# Patient Record
Sex: FEMALE | Race: BLACK OR AFRICAN AMERICAN | NOT HISPANIC OR LATINO | Employment: UNEMPLOYED | URBAN - METROPOLITAN AREA
[De-identification: names, ages, dates, MRNs, and addresses within clinical notes are randomized per-mention and may not be internally consistent; named-entity substitution may affect disease eponyms.]

---

## 2017-07-13 ENCOUNTER — TRANSFERRED RECORDS (OUTPATIENT)
Dept: HEALTH INFORMATION MANAGEMENT | Facility: CLINIC | Age: 62
End: 2017-07-13

## 2017-11-15 ENCOUNTER — TRANSFERRED RECORDS (OUTPATIENT)
Dept: HEALTH INFORMATION MANAGEMENT | Facility: CLINIC | Age: 62
End: 2017-11-15

## 2017-12-26 ENCOUNTER — TELEPHONE (OUTPATIENT)
Dept: BEHAVIORAL HEALTH | Facility: CLINIC | Age: 62
End: 2017-12-26

## 2017-12-26 ENCOUNTER — HOSPITAL ENCOUNTER (INPATIENT)
Facility: CLINIC | Age: 62
LOS: 9 days | Discharge: PSYCHIATRIC OR SUBSTANCE USE FACILITY | DRG: 885 | End: 2018-01-04
Attending: PSYCHIATRY & NEUROLOGY | Admitting: PSYCHIATRY & NEUROLOGY
Payer: MEDICARE

## 2017-12-26 DIAGNOSIS — R45.851 SUICIDAL IDEATION: ICD-10-CM

## 2017-12-26 DIAGNOSIS — F10.24 ALCOHOL DEPENDENCE WITH ALCOHOL-INDUCED MOOD DISORDER (H): ICD-10-CM

## 2017-12-26 DIAGNOSIS — F33.1 MODERATE EPISODE OF RECURRENT MAJOR DEPRESSIVE DISORDER (H): ICD-10-CM

## 2017-12-26 LAB
ALCOHOL BREATH TEST: 0.11 (ref 0–0.01)
AMPHETAMINES UR QL SCN: NEGATIVE
BARBITURATES UR QL: NEGATIVE
BENZODIAZ UR QL: NEGATIVE
CANNABINOIDS UR QL SCN: NEGATIVE
COCAINE UR QL: NEGATIVE
ETHANOL UR QL SCN: POSITIVE
OPIATES UR QL SCN: NEGATIVE

## 2017-12-26 PROCEDURE — HZ2ZZZZ DETOXIFICATION SERVICES FOR SUBSTANCE ABUSE TREATMENT: ICD-10-PCS | Performed by: PSYCHIATRY & NEUROLOGY

## 2017-12-26 PROCEDURE — 80320 DRUG SCREEN QUANTALCOHOLS: CPT | Performed by: PSYCHIATRY & NEUROLOGY

## 2017-12-26 PROCEDURE — 12400007 ZZH R&B MH INTERMEDIATE UMMC

## 2017-12-26 PROCEDURE — 82075 ASSAY OF BREATH ETHANOL: CPT | Performed by: PSYCHIATRY & NEUROLOGY

## 2017-12-26 PROCEDURE — A9270 NON-COVERED ITEM OR SERVICE: HCPCS | Mod: GY | Performed by: PSYCHIATRY & NEUROLOGY

## 2017-12-26 PROCEDURE — 90791 PSYCH DIAGNOSTIC EVALUATION: CPT

## 2017-12-26 PROCEDURE — 25000132 ZZH RX MED GY IP 250 OP 250 PS 637: Mod: GY | Performed by: PSYCHIATRY & NEUROLOGY

## 2017-12-26 PROCEDURE — 99285 EMERGENCY DEPT VISIT HI MDM: CPT | Mod: Z6 | Performed by: PSYCHIATRY & NEUROLOGY

## 2017-12-26 PROCEDURE — 80307 DRUG TEST PRSMV CHEM ANLYZR: CPT | Performed by: PSYCHIATRY & NEUROLOGY

## 2017-12-26 PROCEDURE — 99285 EMERGENCY DEPT VISIT HI MDM: CPT | Mod: 25 | Performed by: PSYCHIATRY & NEUROLOGY

## 2017-12-26 RX ORDER — CARVEDILOL 25 MG/1
25 TABLET ORAL 2 TIMES DAILY WITH MEALS
Status: DISCONTINUED | OUTPATIENT
Start: 2017-12-27 | End: 2018-01-04 | Stop reason: HOSPADM

## 2017-12-26 RX ORDER — TRAZODONE HYDROCHLORIDE 150 MG/1
150 TABLET ORAL AT BEDTIME
Status: DISCONTINUED | OUTPATIENT
Start: 2017-12-26 | End: 2017-12-27

## 2017-12-26 RX ORDER — TIOTROPIUM BROMIDE 18 UG/1
18 CAPSULE ORAL; RESPIRATORY (INHALATION) DAILY PRN
COMMUNITY

## 2017-12-26 RX ORDER — PYRIDOXINE HCL (VITAMIN B6) 25 MG
25 TABLET ORAL DAILY
Status: DISCONTINUED | OUTPATIENT
Start: 2017-12-27 | End: 2018-01-04 | Stop reason: HOSPADM

## 2017-12-26 RX ORDER — NICOTINE 21 MG/24HR
1 PATCH, TRANSDERMAL 24 HOURS TRANSDERMAL DAILY
Status: DISCONTINUED | OUTPATIENT
Start: 2017-12-27 | End: 2018-01-04 | Stop reason: HOSPADM

## 2017-12-26 RX ORDER — CYANOCOBALAMIN 1000 UG/ML
1000 INJECTION, SOLUTION INTRAMUSCULAR; SUBCUTANEOUS
Status: DISCONTINUED | OUTPATIENT
Start: 2018-01-20 | End: 2018-01-04 | Stop reason: HOSPADM

## 2017-12-26 RX ORDER — BISACODYL 10 MG
10 SUPPOSITORY, RECTAL RECTAL DAILY PRN
Status: DISCONTINUED | OUTPATIENT
Start: 2017-12-26 | End: 2018-01-04 | Stop reason: HOSPADM

## 2017-12-26 RX ORDER — HYDROXYZINE HYDROCHLORIDE 25 MG/1
25-50 TABLET, FILM COATED ORAL EVERY 4 HOURS PRN
Status: DISCONTINUED | OUTPATIENT
Start: 2017-12-26 | End: 2018-01-04 | Stop reason: HOSPADM

## 2017-12-26 RX ORDER — PAROXETINE 10 MG/1
20 TABLET, FILM COATED ORAL EVERY MORNING
Status: DISCONTINUED | OUTPATIENT
Start: 2017-12-27 | End: 2017-12-27

## 2017-12-26 RX ORDER — DIAZEPAM 5 MG
5-20 TABLET ORAL EVERY 30 MIN PRN
Status: DISCONTINUED | OUTPATIENT
Start: 2017-12-26 | End: 2017-12-28

## 2017-12-26 RX ORDER — GABAPENTIN 600 MG/1
600 TABLET ORAL 3 TIMES DAILY PRN
Status: DISCONTINUED | OUTPATIENT
Start: 2017-12-26 | End: 2017-12-27

## 2017-12-26 RX ORDER — ALBUTEROL SULFATE 90 UG/1
2 AEROSOL, METERED RESPIRATORY (INHALATION) EVERY 6 HOURS PRN
Status: DISCONTINUED | OUTPATIENT
Start: 2017-12-26 | End: 2018-01-04 | Stop reason: HOSPADM

## 2017-12-26 RX ORDER — LORAZEPAM 0.5 MG/1
0.5 TABLET ORAL ONCE
Status: COMPLETED | OUTPATIENT
Start: 2017-12-26 | End: 2017-12-26

## 2017-12-26 RX ORDER — POTASSIUM CHLORIDE 1500 MG/1
20 TABLET, EXTENDED RELEASE ORAL DAILY
Status: DISCONTINUED | OUTPATIENT
Start: 2017-12-27 | End: 2018-01-04 | Stop reason: HOSPADM

## 2017-12-26 RX ORDER — DILTIAZEM HYDROCHLORIDE 360 MG/1
360 CAPSULE, EXTENDED RELEASE ORAL DAILY
Status: DISCONTINUED | OUTPATIENT
Start: 2017-12-27 | End: 2018-01-04 | Stop reason: HOSPADM

## 2017-12-26 RX ORDER — ALUMINA, MAGNESIA, AND SIMETHICONE 2400; 2400; 240 MG/30ML; MG/30ML; MG/30ML
30 SUSPENSION ORAL EVERY 4 HOURS PRN
Status: DISCONTINUED | OUTPATIENT
Start: 2017-12-26 | End: 2018-01-04 | Stop reason: HOSPADM

## 2017-12-26 RX ORDER — CLONIDINE HYDROCHLORIDE 0.1 MG/1
0.1 TABLET ORAL ONCE
Status: COMPLETED | OUTPATIENT
Start: 2017-12-26 | End: 2017-12-26

## 2017-12-26 RX ORDER — FERROUS SULFATE 325(65) MG
325 TABLET ORAL
Status: DISCONTINUED | OUTPATIENT
Start: 2017-12-27 | End: 2018-01-04 | Stop reason: HOSPADM

## 2017-12-26 RX ORDER — PAROXETINE HYDROCHLORIDE HEMIHYDRATE 25 MG/1
25 TABLET, FILM COATED, EXTENDED RELEASE ORAL EVERY MORNING
Status: ON HOLD | COMMUNITY
End: 2018-01-04

## 2017-12-26 RX ORDER — TRAZODONE HYDROCHLORIDE 50 MG/1
50 TABLET, FILM COATED ORAL
Status: DISCONTINUED | OUTPATIENT
Start: 2017-12-26 | End: 2017-12-26 | Stop reason: CLARIF

## 2017-12-26 RX ADMIN — LORAZEPAM 0.5 MG: 0.5 TABLET ORAL at 21:01

## 2017-12-26 RX ADMIN — DIAZEPAM 5 MG: 5 TABLET ORAL at 23:15

## 2017-12-26 RX ADMIN — CLONIDINE HYDROCHLORIDE 0.1 MG: 0.1 TABLET ORAL at 21:01

## 2017-12-26 RX ADMIN — TRAZODONE HYDROCHLORIDE 150 MG: 150 TABLET ORAL at 23:21

## 2017-12-26 ASSESSMENT — ENCOUNTER SYMPTOMS
EYES NEGATIVE: 1
CONSTITUTIONAL NEGATIVE: 1
ENDOCRINE NEGATIVE: 1
DECREASED CONCENTRATION: 1
NEUROLOGICAL NEGATIVE: 1
CARDIOVASCULAR NEGATIVE: 1
GASTROINTESTINAL NEGATIVE: 1
HEMATOLOGIC/LYMPHATIC NEGATIVE: 1
NERVOUS/ANXIOUS: 1
HALLUCINATIONS: 0
SLEEP DISTURBANCE: 1
RESPIRATORY NEGATIVE: 1
MUSCULOSKELETAL NEGATIVE: 1

## 2017-12-26 ASSESSMENT — ACTIVITIES OF DAILY LIVING (ADL)
DRESS: INDEPENDENT
GROOMING: INDEPENDENT
ORAL_HYGIENE: INDEPENDENT

## 2017-12-26 NOTE — IP AVS SNAPSHOT
[facility]    [address]    [place] [address]    Phone:  [phone]                                       After Visit Summary   12/26/2017    Taty Kearney    MRN: [id number]           After Visit Summary Signature Page     I have received my discharge instructions, and my questions have been answered. I have discussed any challenges I see with this plan with the nurse or doctor.    ..........................................................................................................................................  Patient/Patient Representative Signature      ..........................................................................................................................................  Patient Representative Print Name and Relationship to Patient    ..................................................               ................................................  Date                                            Time    ..........................................................................................................................................  Reviewed by Signature/Title    ...................................................              ..............................................  Date                                                            Time

## 2017-12-26 NOTE — TELEPHONE ENCOUNTER
Pt reportedly phoned the  and asked for an inpatient referral. I phoned pt and left message asking for a return call.

## 2017-12-26 NOTE — IP AVS SNAPSHOT
MRN:[id number]                      After Visit Summary   12/26/2017    Taty Kearney    MRN: [id number]           Thank you!     Thank you for choosing [facility] for your care. Our goal is always to provide you with excellent care.        Patient Information     Date Of Birth          1955        Designated Caregiver       Most Recent Value    Caregiver    Will someone help with your care after discharge? no      About your hospital stay     You were admitted on:  December 26, 2017 You last received care in the:  [address]    You were discharged on:  January 4, 2018       Who to Call     For medical emergencies, please call 911.  For non-urgent questions about your medical care, please call your primary care provider or clinic, None          Attending Provider     Provider Specialty    Lenard Malin MD Psychiatry    Jose Cruz, Abimael THOMAS MD Psychiatry    [facility], Haim Pablo MD Psychiatry       Primary Care Provider Fax #    [facility] [phone]      Further instructions from your care team        Behavioral Discharge Planning and Instructions      Summary:  You were admitted on 12/26/2017  due to Depression and Suicidal Ideations.  You were treated by Dr. Haim Montgomery MD and discharged on 01/04/2018 from [address] to Substance Abuse Treatment Program at [facility].    Mental Health Diagnosis  Bipolar Illness, type 2, depressed    Health Care Follow-up Appointments:   Pt is going to a treatment facility and prefers to schedule her own appointments.  Attend all scheduled appointments with your outpatient providers. Call at least 24 hours in advance if you need to reschedule an appointment to ensure continued access to your outpatient providers.   Major Treatments, Procedures and Findings:  You were provided with: a psychiatric assessment, assessed for medical stability and medication evaluation and/or management    Symptoms to Report: feeling more aggressive,  increased confusion, losing more sleep, mood getting worse or thoughts of suicide    Early warning signs can include: increased depression or anxiety sleep disturbances increased thoughts or behaviors of suicide or self-harm  increased unusual thinking, such as paranoia or hearing voices    Safety and Wellness:  Take all medicines as directed.  Make no changes unless your doctor suggests them.      Follow treatment recommendations.  Refrain from alcohol and non-prescribed drugs.  If there is a concern for safety, call 911.    Resources:   Crisis Intervention: 759.399.3331 or 632-342-8395 (TTY: 932.346.4164).  Call anytime for help.  [place] on Mental Illness (www.[place].m[place].org): 544.235.1956 or 089-947-4161.  Alcoholics Anonymous (www.alcoholics-anonymous.org): Check your phone book for your local chapter.  Suicide Awareness Voices of Education (SAVE) (www.save.org): 716-576-HPCZ (6374)  National Suicide Prevention Line (www.mentalhealthmn.org): 972-025-RJBY (7100)  Mental Health Consumer/Survivor Network of [place] (www.mhcsn.net): 631.355.6910 or 700-982-0163  Mental Health Association of [place] (www.mentalhealth.org): 509.850.4624 or 703-890-0657  Self- Management and Recovery Training., SMART-- Toll free: [phone]  [email or web address]  [facility] Crisis (COPE) Response - Adult [phone]    The treatment team has appreciated the opportunity to work with you.     If you have any questions or concerns our unit number is [phone].        Pending Results     No orders found from 12/24/2017 to 12/27/2017.            Statement of Approval     Ordered          01/04/18 1357  I have reviewed and agree with all the recommendations and orders detailed in this document.  EFFECTIVE NOW     Approved and electronically signed by:  Haim Hope MD             Admission Information     Date & Time Provider Department Dept. Phone    12/26/2017 Haim Hope MD [address] [phone]      Your  "Vitals Were     Blood Pressure Pulse Temperature Respirations Height Weight    138/92 92 98.6  F (37  C) (Tympanic) 16 1.651 m (5' 5\") 70.5 kg (155 lb 8 oz)    Pulse Oximetry BMI (Body Mass Index)                100% 25.88 kg/m2          [employer] Information     [employer] lets you send messages to your doctor, view your test results, renew your prescriptions, schedule appointments and more. To sign up, go to [email or web address] . Click on \"Log in\" on the left side of the screen, which will take you to the Welcome page. Then click on \"Sign up Now\" on the right side of the page.     You will be asked to enter the access code listed below, as well as some personal information. Please follow the directions to create your username and password.     Your access code is: [email or web address]  Expires: 2018  1:53 PM     Your access code will  in 90 days. If you need help or a new code, please call your [place] clinic or [phone].        Care EveryWhere ID     This is your Care EveryWhere ID. This could be used by other organizations to access your [place] medical records  [id number]        Equal Access to Services     NICK COURTNEY AH: Dorian martinez [place], waaxda luqadaha, qaybta kaalmada adeegyada, michelle rizzo. So [facility] [phone].    ATENCIÓN: Si habla español, tiene a noyola disposición servicios gratuitos de asistencia lingüística. Llame al [phone].    We comply with applicable federal civil rights laws and [place] laws. We do not discriminate on the basis of race, color, national origin, age, disability, sex, sexual orientation, or gender identity.               Review of your medicines      START taking        Dose / Directions    divalproex 500 MG EC tablet   Commonly known as:  DEPAKOTE        Dose:  500 mg   Take 1 tablet (500 mg) by mouth   Quantity:  60 tablet   Refills:  0       DULoxetine 30 MG EC capsule   Commonly known as:  CYMBALTA        Dose:  30 mg "   Start taking on:  1/5/2018   Take 1 capsule (30 mg) by mouth daily   Quantity:  60 capsule   Refills:  0       hydrALAZINE 25 MG tablet   Commonly known as:  APRESOLINE        Dose:  25-50 mg   Take 1-2 tablets (25-50 mg) by mouth 4 times daily as needed (sbp>160)   Quantity:  120 tablet   Refills:  0       multivitamin, therapeutic with minerals Tabs tablet        Dose:  1 tablet   Start taking on:  1/5/2018   Take 1 tablet by mouth daily   Quantity:  30 each   Refills:  0       nicotine 21 MG/24HR 24 hr patch   Commonly known as:  NICODERM CQ        Dose:  1 patch   Start taking on:  1/5/2018   Place 1 patch onto the skin daily   Quantity:  30 patch   Refills:  0         CONTINUE these medicines which may have CHANGED, or have new prescriptions. If we are uncertain of the size of tablets/capsules you have at home, strength may be listed as something that might have changed.        Dose / Directions    tiotropium 18 MCG capsule   Commonly known as:  SPIRIVA   This may have changed:  Another medication with the same name was removed. Continue taking this medication, and follow the directions you see here.        Dose:  18 mcg   Inhale 18 mcg into the lungs daily as needed   Refills:  0         CONTINUE these medicines which have NOT CHANGED        Dose / Directions    albuterol 108 (90 BASE) MCG/ACT Inhaler   Commonly known as:  PROAIR HFA   Used for:  Tobacco abuse, COPD (chronic obstructive pulmonary disease) (H)        Dose:  2 puff   Inhale 2 puffs into the lungs every 6 hours as needed for shortness of breath / dyspnea.   Quantity:  1 Inhaler   Refills:  5       carvedilol 25 MG tablet   Commonly known as:  COREG        Dose:  25 mg   Take 25 mg by mouth 2 times daily (with meals)   Refills:  0       CHLORTHALIDONE PO        Dose:  12.5 mg   Take 12.5 mg by mouth daily   Refills:  0       cyanocobalamin 1000 MCG/ML injection   Commonly known as:  VITAMIN B12        Dose:  1 mL   Inject 1 mL into the muscle  every 30 days   Refills:  0       diltiazem 360 MG 24 hr capsule   Commonly known as:  TIAZAC   Used for:  Hypertension        Dose:  360 mg   Take 1 capsule by mouth daily.   Quantity:  30 capsule   Refills:  3       ferrous sulfate 325 (65 FE) MG tablet   Commonly known as:  IRON        Dose:  325 mg   Take 325 mg by mouth daily (with breakfast)   Refills:  0       GABAPENTIN PO        Dose:  600 mg   Take 600 mg by mouth 3 times daily as needed (chronic back pain)   Refills:  0       K-DUR PO        Dose:  20 mEq   Take 20 mEq by mouth daily   Refills:  0       TRAZODONE HCL PO        Dose:  150 mg   Take 150 mg by mouth At Bedtime   Refills:  0       VITAMIN B6 PO        Dose:  25 mg   Take 25 mg by mouth daily   Refills:  0       VITAMIN D (CHOLECALCIFEROL) PO        Dose:  1000 Units   Take 1,000 Units by mouth daily   Refills:  0         STOP taking     PARoxetine 25 MG 24 hr tablet   Commonly known as:  PAXIL-CR                    Protect others around you: Learn how to safely use, store and throw away your medicines at www.disposemymeds.org.             Medication List: This is a list of all your medications and when to take them. Check marks below indicate your daily home schedule. Keep this list as a reference.      Medications           Morning Afternoon Evening Bedtime As Needed    albuterol 108 (90 BASE) MCG/ACT Inhaler   Commonly known as:  PROAIR HFA   Inhale 2 puffs into the lungs every 6 hours as needed for shortness of breath / dyspnea.                                carvedilol 25 MG tablet   Commonly known as:  COREG   Take 25 mg by mouth 2 times daily (with meals)   Last time this was given:  25 mg on 1/4/2018  8:18 AM                                CHLORTHALIDONE PO   Take 12.5 mg by mouth daily   Last time this was given:  12.5 mg on 1/4/2018  8:18 AM                                cyanocobalamin 1000 MCG/ML injection   Commonly known as:  VITAMIN B12   Inject 1 mL into the muscle every 30  days                                diltiazem 360 MG 24 hr capsule   Commonly known as:  TIAZAC   Take 1 capsule by mouth daily.   Last time this was given:  360 mg on 1/4/2018  8:18 AM                                divalproex 500 MG EC tablet   Commonly known as:  DEPAKOTE   Take 1 tablet (500 mg) by mouth   Last time this was given:  500 mg on 1/3/2018  8:43 PM                                DULoxetine 30 MG EC capsule   Commonly known as:  CYMBALTA   Take 1 capsule (30 mg) by mouth daily   Start taking on:  1/5/2018   Last time this was given:  30 mg on 1/4/2018  8:18 AM                                ferrous sulfate 325 (65 FE) MG tablet   Commonly known as:  IRON   Take 325 mg by mouth daily (with breakfast)   Last time this was given:  325 mg on 1/4/2018  8:18 AM                                GABAPENTIN PO   Take 600 mg by mouth 3 times daily as needed (chronic back pain)   Last time this was given:  600 mg on 1/4/2018  1:36 PM                                hydrALAZINE 25 MG tablet   Commonly known as:  APRESOLINE   Take 1-2 tablets (25-50 mg) by mouth 4 times daily as needed (sbp>160)   Last time this was given:  25 mg on 12/27/2017  2:07 AM                                K-DUR PO   Take 20 mEq by mouth daily   Last time this was given:  20 mEq on 1/4/2018  8:20 AM                                multivitamin, therapeutic with minerals Tabs tablet   Take 1 tablet by mouth daily   Start taking on:  1/5/2018   Last time this was given:  1 tablet on 1/4/2018  8:18 AM                                nicotine 21 MG/24HR 24 hr patch   Commonly known as:  NICODERM CQ   Place 1 patch onto the skin daily   Start taking on:  1/5/2018   Last time this was given:  1 patch on 1/4/2018  8:18 AM                                tiotropium 18 MCG capsule   Commonly known as:  SPIRIVA   Inhale 18 mcg into the lungs daily as needed                                TRAZODONE HCL PO   Take 150 mg by mouth At Bedtime   Last time  this was given:  150 mg on 1/3/2018  9:30 PM                                VITAMIN B6 PO   Take 25 mg by mouth daily                                VITAMIN D (CHOLECALCIFEROL) PO   Take 1,000 Units by mouth daily   Last time this was given:  1,000 Units on 1/4/2018  8:18 AM

## 2017-12-26 NOTE — TELEPHONE ENCOUNTER
Pt phoned, she states she is looking for inpatient CD treatment. I gave her the number to schedule a CD eval.

## 2017-12-27 LAB
ALBUMIN SERPL-MCNC: 3.7 G/DL (ref 3.4–5)
ALP SERPL-CCNC: 98 U/L (ref 40–150)
ALT SERPL W P-5'-P-CCNC: 80 U/L (ref 0–50)
ANION GAP SERPL CALCULATED.3IONS-SCNC: 8 MMOL/L (ref 3–14)
AST SERPL W P-5'-P-CCNC: 97 U/L (ref 0–45)
BASOPHILS # BLD AUTO: 0 10E9/L (ref 0–0.2)
BASOPHILS NFR BLD AUTO: 0.5 %
BILIRUB SERPL-MCNC: 1.2 MG/DL (ref 0.2–1.3)
BUN SERPL-MCNC: 11 MG/DL (ref 7–30)
CALCIUM SERPL-MCNC: 9.2 MG/DL (ref 8.5–10.1)
CHLORIDE SERPL-SCNC: 102 MMOL/L (ref 94–109)
CO2 SERPL-SCNC: 31 MMOL/L (ref 20–32)
CREAT SERPL-MCNC: 0.96 MG/DL (ref 0.52–1.04)
DEPRECATED CALCIDIOL+CALCIFEROL SERPL-MC: 25 UG/L (ref 20–75)
DIFFERENTIAL METHOD BLD: ABNORMAL
EOSINOPHIL # BLD AUTO: 0.2 10E9/L (ref 0–0.7)
EOSINOPHIL NFR BLD AUTO: 3.5 %
ERYTHROCYTE [DISTWIDTH] IN BLOOD BY AUTOMATED COUNT: 13 % (ref 10–15)
FOLATE SERPL-MCNC: 45 NG/ML
GFR SERPL CREATININE-BSD FRML MDRD: 59 ML/MIN/1.7M2
GGT SERPL-CCNC: 131 U/L (ref 0–40)
GLUCOSE SERPL-MCNC: 111 MG/DL (ref 70–99)
HCT VFR BLD AUTO: 45.8 % (ref 35–47)
HGB BLD-MCNC: 15.9 G/DL (ref 11.7–15.7)
IMM GRANULOCYTES # BLD: 0 10E9/L (ref 0–0.4)
IMM GRANULOCYTES NFR BLD: 0.2 %
LYMPHOCYTES # BLD AUTO: 3.2 10E9/L (ref 0.8–5.3)
LYMPHOCYTES NFR BLD AUTO: 50.2 %
MCH RBC QN AUTO: 32.3 PG (ref 26.5–33)
MCHC RBC AUTO-ENTMCNC: 34.7 G/DL (ref 31.5–36.5)
MCV RBC AUTO: 93 FL (ref 78–100)
MONOCYTES # BLD AUTO: 0.4 10E9/L (ref 0–1.3)
MONOCYTES NFR BLD AUTO: 6.5 %
NEUTROPHILS # BLD AUTO: 2.5 10E9/L (ref 1.6–8.3)
NEUTROPHILS NFR BLD AUTO: 39.1 %
NRBC # BLD AUTO: 0 10*3/UL
NRBC BLD AUTO-RTO: 0 /100
PLATELET # BLD AUTO: 213 10E9/L (ref 150–450)
POTASSIUM SERPL-SCNC: 3.3 MMOL/L (ref 3.4–5.3)
PROT SERPL-MCNC: 7.7 G/DL (ref 6.8–8.8)
RBC # BLD AUTO: 4.92 10E12/L (ref 3.8–5.2)
SODIUM SERPL-SCNC: 141 MMOL/L (ref 133–144)
TSH SERPL DL<=0.005 MIU/L-ACNC: 0.59 MU/L (ref 0.4–4)
VIT B12 SERPL-MCNC: 534 PG/ML (ref 193–986)
WBC # BLD AUTO: 6.3 10E9/L (ref 4–11)

## 2017-12-27 PROCEDURE — 25000132 ZZH RX MED GY IP 250 OP 250 PS 637: Mod: GY | Performed by: PSYCHIATRY & NEUROLOGY

## 2017-12-27 PROCEDURE — 12400007 ZZH R&B MH INTERMEDIATE UMMC

## 2017-12-27 PROCEDURE — 82306 VITAMIN D 25 HYDROXY: CPT | Performed by: PSYCHIATRY & NEUROLOGY

## 2017-12-27 PROCEDURE — 99222 1ST HOSP IP/OBS MODERATE 55: CPT | Performed by: NURSE PRACTITIONER

## 2017-12-27 PROCEDURE — 25000132 ZZH RX MED GY IP 250 OP 250 PS 637: Mod: GY | Performed by: INTERNAL MEDICINE

## 2017-12-27 PROCEDURE — 90853 GROUP PSYCHOTHERAPY: CPT

## 2017-12-27 PROCEDURE — 99207 ZZC CONSULT E&M CHANGED TO INITIAL LEVEL: CPT | Performed by: NURSE PRACTITIONER

## 2017-12-27 PROCEDURE — A9270 NON-COVERED ITEM OR SERVICE: HCPCS | Mod: GY | Performed by: PSYCHIATRY & NEUROLOGY

## 2017-12-27 PROCEDURE — 80053 COMPREHEN METABOLIC PANEL: CPT | Performed by: PSYCHIATRY & NEUROLOGY

## 2017-12-27 PROCEDURE — 82977 ASSAY OF GGT: CPT | Performed by: PSYCHIATRY & NEUROLOGY

## 2017-12-27 PROCEDURE — A9270 NON-COVERED ITEM OR SERVICE: HCPCS | Mod: GY | Performed by: NURSE PRACTITIONER

## 2017-12-27 PROCEDURE — A9270 NON-COVERED ITEM OR SERVICE: HCPCS | Mod: GY | Performed by: INTERNAL MEDICINE

## 2017-12-27 PROCEDURE — 84443 ASSAY THYROID STIM HORMONE: CPT | Performed by: PSYCHIATRY & NEUROLOGY

## 2017-12-27 PROCEDURE — 82607 VITAMIN B-12: CPT | Performed by: PSYCHIATRY & NEUROLOGY

## 2017-12-27 PROCEDURE — 82746 ASSAY OF FOLIC ACID SERUM: CPT | Performed by: PSYCHIATRY & NEUROLOGY

## 2017-12-27 PROCEDURE — 85025 COMPLETE CBC W/AUTO DIFF WBC: CPT | Performed by: PSYCHIATRY & NEUROLOGY

## 2017-12-27 PROCEDURE — 25000132 ZZH RX MED GY IP 250 OP 250 PS 637: Mod: GY | Performed by: NURSE PRACTITIONER

## 2017-12-27 PROCEDURE — 36415 COLL VENOUS BLD VENIPUNCTURE: CPT | Performed by: PSYCHIATRY & NEUROLOGY

## 2017-12-27 RX ORDER — DULOXETIN HYDROCHLORIDE 30 MG/1
30 CAPSULE, DELAYED RELEASE ORAL DAILY
Status: DISCONTINUED | OUTPATIENT
Start: 2017-12-27 | End: 2018-01-04 | Stop reason: HOSPADM

## 2017-12-27 RX ORDER — PRAZOSIN HYDROCHLORIDE 1 MG/1
1 CAPSULE ORAL AT BEDTIME
Status: DISCONTINUED | OUTPATIENT
Start: 2017-12-27 | End: 2017-12-28

## 2017-12-27 RX ORDER — GABAPENTIN 600 MG/1
600 TABLET ORAL 3 TIMES DAILY
Status: DISCONTINUED | OUTPATIENT
Start: 2017-12-27 | End: 2018-01-04 | Stop reason: HOSPADM

## 2017-12-27 RX ORDER — DIVALPROEX SODIUM 500 MG/1
500 TABLET, DELAYED RELEASE ORAL
Status: DISCONTINUED | OUTPATIENT
Start: 2017-12-27 | End: 2018-01-04 | Stop reason: HOSPADM

## 2017-12-27 RX ADMIN — VITAMIN D, TAB 1000IU (100/BT) 1000 UNITS: 25 TAB at 08:45

## 2017-12-27 RX ADMIN — PAROXETINE 20 MG: 10 TABLET, FILM COATED ORAL at 08:47

## 2017-12-27 RX ADMIN — DIAZEPAM 5 MG: 5 TABLET ORAL at 09:41

## 2017-12-27 RX ADMIN — Medication 25 MG: at 08:47

## 2017-12-27 RX ADMIN — DILTIAZEM HYDROCHLORIDE 360 MG: 360 CAPSULE, EXTENDED RELEASE ORAL at 08:46

## 2017-12-27 RX ADMIN — CARVEDILOL 25 MG: 25 TABLET, FILM COATED ORAL at 08:45

## 2017-12-27 RX ADMIN — Medication 25 MG: at 02:07

## 2017-12-27 RX ADMIN — CARVEDILOL 25 MG: 25 TABLET, FILM COATED ORAL at 18:00

## 2017-12-27 RX ADMIN — FERROUS SULFATE TAB 325 MG (65 MG ELEMENTAL FE) 325 MG: 325 (65 FE) TAB at 08:45

## 2017-12-27 RX ADMIN — GABAPENTIN 600 MG: 600 TABLET, FILM COATED ORAL at 14:11

## 2017-12-27 RX ADMIN — NICOTINE 1 PATCH: 21 PATCH, EXTENDED RELEASE TRANSDERMAL at 08:46

## 2017-12-27 RX ADMIN — Medication 12.5 MG: at 08:46

## 2017-12-27 RX ADMIN — DIVALPROEX SODIUM 500 MG: 500 TABLET, DELAYED RELEASE ORAL at 19:20

## 2017-12-27 RX ADMIN — GABAPENTIN 600 MG: 600 TABLET, FILM COATED ORAL at 19:20

## 2017-12-27 RX ADMIN — HYDROXYZINE HYDROCHLORIDE 50 MG: 25 TABLET ORAL at 21:12

## 2017-12-27 RX ADMIN — DIAZEPAM 5 MG: 5 TABLET ORAL at 05:44

## 2017-12-27 RX ADMIN — HYDROXYZINE HYDROCHLORIDE 50 MG: 25 TABLET ORAL at 16:53

## 2017-12-27 RX ADMIN — POTASSIUM CHLORIDE 20 MEQ: 20 TABLET, EXTENDED RELEASE ORAL at 08:47

## 2017-12-27 RX ADMIN — DULOXETINE HYDROCHLORIDE 30 MG: 30 CAPSULE, DELAYED RELEASE ORAL at 09:26

## 2017-12-27 RX ADMIN — PRAZOSIN HYDROCHLORIDE 1 MG: 1 CAPSULE ORAL at 21:11

## 2017-12-27 ASSESSMENT — ACTIVITIES OF DAILY LIVING (ADL)
GROOMING: INDEPENDENT
ORAL_HYGIENE: INDEPENDENT
DRESS: INDEPENDENT
GROOMING: INDEPENDENT
ORAL_HYGIENE: INDEPENDENT
DRESS: INDEPENDENT

## 2017-12-27 NOTE — PROGRESS NOTES
"Pt admitted to [facility] [address] [place] [address] from [facility] where she was BIB her daughter in law today.  Pt was to start attending an out pt chemical dependency program today, however pt unable to attend due to increased depression and continued use of ETOH.  Pt has been drinking 6 - 8 drinks daily for the past several months.  Pt reports she has been feeling suicidal.  No hx of SI attempts.  Pt's brother committed suicide 3 yrs ago by OD.  Pt reports that her suicidal thoughts are fleeting and \"for attention.\"  Pt states she has been taking 200 mg Trazodone, although 150 mg is prescribed - states she may be trying to OD.  This is her fist  hospitalization.  Pt reports she would like to attend in-patient treatment, as she feels this may be more effective.  Alcohol breath test 0.11 in ED. BP elevated in ED - given Ativan 0.5 mg and Catapress 0.1 mg at 2109.     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  Denies seizure hx.    On the unit, pt is calm and cooperative. Upon arrival to , /105, , T 99.7.  MSSA Score:  8, 5 mg valium given at 2315.  Pt endorses depression and anxiety.  Denies SI at this time and contracts for safety.    "

## 2017-12-27 NOTE — PROGRESS NOTES
"   12/27/17 [facility]   Behavioral Health   1. Wish to be Dead No   Pt denies SI.  \"No- I really want to get help, not be passed over.\"  Seeks ETOH treatment.  Reports her family/children are a good support but has insight regarding that those relationships to keep separate from professional help.  \"don't want to weigh them down\"    "

## 2017-12-27 NOTE — PROGRESS NOTES
"CLINICAL NUTRITION SERVICES - ASSESSMENT NOTE     Nutrition Prescription    RECOMMENDATIONS FOR MDs/PROVIDERS TO ORDER:  None today    Malnutrition Status:    Non-severe malnutrition in the context of environmental or social circumstances    Recommendations already ordered by Registered Dietitian (RD):  Ensure Plus QID with meals and HS snack    Future/Additional Recommendations:  As PO intakes improve, consider decreasing supplementation      REASON FOR ASSESSMENT  Taty Kearney is a/an 62 year old female assessed by the dietitian for Admission Nutrition Risk Screen for unintentional loss of 10# or more in the past two months and reduced oral intake over the last month    NUTRITION HISTORY  - Pt reports that she has had very little PO intake over the last 3-4 days since her alcohol use has been increased. She eats a wide variety of foods and denies any food allergies or intolerances. She like to drink Ensure Plus/Boost at home but finds they are too expensive to drink regularly.   - Pt reports she had a gastrectomy and her whole stomach and part of her small intestine is removed. She c/o diarrhea when she overeats or drinks alcohol.   - Currently, she has no appetite and has not been eating well during this admission.     CURRENT NUTRITION ORDERS  Diet: Regular  Intake/Tolerance: pt reports eating mashed potatoes w/ gravy and some of her dinner roll for lunch today.     LABS  Labs reviewed  - K+ 3.3 (L)  - Folate 45 (wnl)  - Vitamin B12 534 (wnl)  - Vitamin D deficiency screen 25 (wnl)    MEDICATIONS  Medications reviewed  - Vitamin B6  - Vitamin B12  - Vitamin D3  - Iron    ANTHROPOMETRICS  Height: 165.1 cm (5' 5\")  Most Recent Weight: 65.1 kg (143 lb 8 oz)    IBW: 56.8 kg (114% IBW)  BMI: Normal BMI  Weight History: minimal wt hx. Pt reports her UBW is 157 lbs and she reports losing 10 lbs over the last several weeks and feels her clothing is fitting looser. Per UBW, she has lost 13.5 lbs (8.6%). She would like " to return to 157 lbs.  Wt Readings from Last 5 Encounters:   12/26/17 65.1 kg (143 lb 8 oz)   10/15/15 76.8 kg (169 lb 6.4 oz)   01/24/15 81.5 kg (179 lb 9.6 oz)   12/14/14 80.7 kg (178 lb)   06/13/12 92.5 kg (204 lb)     Dosing Weight: 65 kg - admit wt    ASSESSED NUTRITION NEEDS  Estimated Energy Needs: 1293-5067 kcals/day (25 - 30 kcals/kg)  Justification: Maintenance  Estimated Protein Needs: 65-78 grams protein/day (1 - 1.2 grams of pro/kg)  Justification: Maintenance  Estimated Fluid Needs: 1 mL/kcal  Justification: Maintenance    PHYSICAL FINDINGS  See malnutrition section below.    MALNUTRITION  % Intake: < 75% for > 7 days (non-severe)  % Weight Loss: > 5% in 1 month (severe)  Subcutaneous Fat Loss: Facial region: mild  Muscle Loss: None observed  Fluid Accumulation/Edema: None noted  Malnutrition Diagnosis: Non-severe malnutrition in the context of environmental or social circumstances    NUTRITION DIAGNOSIS  Inadequate oral intake related to poor appetite as evidenced by pt likely meeting <50% of nutritional needs over the last 3 days and a 8.6% wt loss over the last ~month.    INTERVENTIONS  Implementation  Discussed nutrition history and PO since admission. Discussed menu ordering and snacks available on the unit. Pt states she enjoys the food but just does not have an appetite. Discussed oral nutrition supplements and she enjoys Ensure Plus (all flavors except chocolate). Encouraged adequate PO of food and fluids.    Goals  Patient to consume % of nutritionally adequate meal trays TID, or the equivalent with supplements/snacks.     Monitoring/Evaluation  Progress toward goals will be monitored and evaluated per protocol.      Matilde Ashby RD, LD  Unit Pager: [phone]

## 2017-12-27 NOTE — CONSULTS
[facility]  Internal Medicine Initial Visit      Taty Kearney MRN# [id number]   YOB: 1955 Age: 62 year old   Date of Admission: 12/26/2017  PCP: [facility]  Date of Service: 12/27/2017    Referring Provider: Behavioral Health - Abimael Billingsley MD  Reason for Visit: H/o HTN with elevated BP in the context of ETOH detox.          Assessment and Recommendations:     Taty Kearney is a 62 year old female with a history of hepatitis C s/p treatment, recurrrent gential HSV, pancreatitis, HTN, ETOH abuse, depression, COPD, bilateral OA of the knees s/p TKA, tobacco use, bipolar 2 d/o, PTSD, h/o GIB who was admitted and being followed by psychiatry for depression and etoh detox. Please refer to psychiatry notes for further details. IM was asked to see this patient for HTN management in the context of withdrawal.     1. Mental health decompensation  Management per primary psychiatry team    2. ETOH abuse with detox. ETOH level on admission 0.11. No h/o seizures. + hallucinations.   - Recommend MVI, folic acid and thiamine supplementation  - Agree with MSSA with valium  - Management per primary psychiatry team    3. HTN, uncontrolled. Elevation likely r/t acute withdrawal. Absent coronary symptoms. No h/o CVA. No HA. PTA on diltiazem XR 360mg daily, chlorthalidone 12.5mg daily, coreg 25mg BID. + smoker. No DM. Last  (2013). No family history of heart disease.   - Continue PTA meds.   - Hydralazine prn ordered.   - IM will follow BP trend with you.     4. COPD. PTA on albuterol prn. Last use 1 week ago. No controller therapies. Sx stable. On RA.   - Continue albuterol prn.     5. Recurrent genital HSV. No active flare. Not on suppressive therapy.     6. H/o hep C, acute transaminitis. S/p Harvoni 2014. Last RNA quant negative 2015. LFT function previously WNL. This admission, ALT 80, AST 97,  most reflective of ETOH induced elevation. Clinically asymptomatic.    - CMP in 24 hours for stability.     7. H/o pancreatitis. Verbalized to be 2/2 ETOH, advil. Last flare 1 year ago. No abdominal or back pain today.     8. Neuropathy. Likely 2/2 ETOH. No h/o DM. PTA on neurontin 600mg TID.   - Continue.     9. H/o vitamin B12 and iron deficiency. PTA on vitamin B12 q 30 days, iron daily.   - Continue home therapies. Last Vitamin B12 dose 12/22/17.     10. Hypokalemia. PTA on potassium 20meq daily. Likely r/t ETOH abuse and diuretic use. 3.3 today.   - Continue home potassium. Received dose this am. K tomorrow.     11. Acute polycythemia. Likely r/t smoking and/or COPD history or recent ETOH binge.   - Push po fluids. No IP w/u indicated.     Medicine will follow BP with you. No further medicine recommendations at this time, please page with any additional concerns.   Thank you for this opportunity to be involved in your patient's care.      Monie Good, CHLOE, CNP  Hospitalist Service   For questions, contact the job code.            History of Present Illness:   History is obtained from the patient and medical record.     Taty Kearney is a 62 year old female with a history of hepatitis C s/p treatment, recurrrent gential HSV, pancreatitis, HTN, ETOH abuse, depression, COPD, bilateral OA of the knees s/p TKA, tobacco use, bipolar 2 d/o, PTSD, h/o GIB who was admitted and being followed by psychiatry for depression and etoh detox. Please refer to psychiatry notes for further details. IM was asked to see this patient for HTN management in the context of withdrawal.     [place] [facility] States she takes 3 medications for her BP (coreg, chlorthalidone, diltiazem). Denies h/o CAD, heart failure, MI. States no family history of heart disease that she is aware of. Mentions continuation of smoking, 1ppd. Does have diagnosis of COPD. States last albuterol use was 1 week ago and infrequent. No controller medications. Per patient, her SBP is usually 120, elevations here  "are \"abnormal\". Mentions ETOH daily use in the past 4 months. Does have h/o severe withdrawal with hallucinations. No seizures. Denies feeling anxious. No uncontrolled pain. Chronic neuropathy controlled on neurontin TID. She is wondering why she has not got that yet today. Denies any active CP. Very minimal HA this morning but \"it will go away\" and \"not uncommon for me\".           Review of Systems:     Constitutional: negative for fever/chills or recent weight changes   Eyes: negative for vision changes   Ears/Nose/Throat: negative for ear pain, sore throat, nasal or sinus congestion   Cardiovascular: negative for chest pain or palpitations   Respiratory: negative for cough or shortness of breath   Gastrointestinal: negative for abdominal pain, N/V, diarrhea or constipation   Genitourinary: negative for dysuria, urinary urgency or frequency   Musculoskeletal: negative for new joint pain   Neurologic: HA as above. N numbness, tingling, weakness of extremities. Neuropathy at BL.   Skin: negative for rashes or wounds   Endocrine: negative for polydipsia, polyphagia, polyuria; negative for heat/cold intolerance           Past Medical History:   PMH has been reviewed and updated with visit today.     Past Medical History:   Diagnosis Date     Bipolar 2 disorder (H)      COPD (chronic obstructive pulmonary disease) (H)      Depression      Ectopic pregnancy      ETOH abuse 2005 and 1999    treatment for both ETOH and crack     History of tubal ligation      HTN      Menopause      Neuroendocrine tumor 2013    s/p gastrectomy     Neuropathy      Osteoarthritis of both knees      Pancreatitis, acute     recurrent 7/2008     PTSD (post-traumatic stress disorder)      Recurrent HSV (herpes simplex virus)     genital     Tobacco abuse      Unspecified drug dependence, unspecified      Unspecified viral hepatitis C without hepatic coma     treated [facility] toy 2014     Vitamin B12 deficiency              Past Surgical " History:   PSH has been reviewed and updated with visit today.     Past Surgical History:   Procedure Laterality Date     BREAST BIOPSY, RT/LT      right only benign     C TOTAL KNEE ARTHROPLASTY Bilateral      C/SECTION, CLASSICAL  three         GASTRECTOMY  2013    r/t neuroendocrine tumor     TUBAL LIGATION               Social History:   Patient's social history has been reviewed and updated today.     Social History     Social History     Marital status:      Spouse name: N/A     Number of children: N/A     Years of education: N/A     Occupational History     Not on file.     Social History Main Topics     Smoking status: Current Every Day Smoker     Packs/day: 1.00     Types: Cigarettes     Smokeless tobacco: Never Used     Alcohol use No      Comment: quit x 2.5 months ago gone back to AA     Drug use: No     Sexual activity: Yes     Partners: Male     Other Topics Concern     Not on file     Social History Narrative    Intake 17:            Has 4 children whom are all healthy.         Currently on disability.     H/o 12th grade education        Tobacco use: 1ppd    Alcohol use: 4 months of daily alcohol consumption that includes 6-12 beers daily with 2 shots of yamile.     Drug use: denies              Family History:   Patient's family history has been reviewed and updated today.     Family History   Problem Relation Age of Onset     DIABETES Maternal Grandmother      Blood Disease Brother      aids     Blood Disease Son      hiv     DIABETES Maternal Aunt      Family Status   Relation Status     Maternal Grandmother      Brother      Son      Mother     murdered     Father     murdered     Maternal Aunt              Allergies:   Patient's allergies have been reviewed and updated today.     No Known Allergies          Medications:     Diltiazemn ER 360mg daily  Albuterol prn  Vitamin D 100mcg daily  Potassium 20meq daily  Neurontin 600mg  "TID  Trazodone 150mg at hs.   Chlorthalidone 12.5mg daily  Paxil CR 25mg daily  Coreg 25mg BID  Iron supplementation 325mg daily  Vitamin B12 every 30 days. Last injection 12/22           Physical Exam:     BP (!) 158/97  Pulse 76  Temp 97.5  F (36.4  C) (Tympanic)  Resp 16  Ht 1.651 m (5' 5\")  Wt 65.1 kg (143 lb 8 oz)  SpO2 94%  BMI 23.88 kg/m2    Vitals are reviewed.     GENERAL: Non-toxic appearing in NAMD.   HEENT: Anicteric sclera. PERRLa intact. Mucous membranes moist. No thrush. Hair wrap in place.   CV: RRR. S1, S2. No murmurs appreciated.   RESPIRATORY: Effort normal at rest and with talking activity. Lungs with restricted movement throughout but clear.   GI: Abdomen soft and non distended with normoactive bowel sounds present in all quadrants. No tenderness. No mass.   NEUROLOGICAL: No involuntary movements. A&Ox 3. Facial symmetry intact. No confusion. Mood down.   MUSC:  Joint appearance without acute swelling, warmth, redness. Ambulates with ease.   EXTREMITIES: No peripheral edema. Good distal perfusion.   SKIN: No jaundice. No rashes or sores to exposed body areas.           Data:   CBC:  Recent Labs   Lab Test  12/27/17   0800   WBC  6.3   RBC  4.92   HGB  15.9*   HCT  45.8   MCV  93   MCH  32.3   MCHC  34.7   RDW  13.0   PLT  213       CMP:  Recent Labs   Lab Test  12/27/17   0800   NA  141   POTASSIUM  3.3*   CHLORIDE  102   SHIRAZ  9.2   CO2  31   BUN  11   CR  0.96   GLC  111*   AST  97*   ALT  80*   BILITOTAL  1.2   ALBUMIN  3.7   PROTTOTAL  7.7   ALKPHOS  98       TSH:  TSH   Date Value Ref Range Status   12/27/2017 0.59 0.40 - 4.00 mU/L Final     ETOH 0.011  HCG: postmenopausal    Unresulted Labs Ordered in the Past 30 Days of this Admission     Date and Time Order Name Status Description    12/27/2017 0030 Vitamin D In process                "

## 2017-12-27 NOTE — PLAN OF CARE
"Problem: Suicidal Behavior (Adult)  Goal: Suicidal Behavior is Absent/Minimized/Managed    Attended 1 of 2 OT groups. She participated in activity focused on Aftercare. She offered answers in context. Stated wanting to expand her coping strategies though provided a list of 5 ideas to \"help me not drink\". Affect was flat, eye contact was direct to speaker. She was pleasant on approach, seemed interested in being actively involved and attentive. Pt will be given to complete a written self assessment. OT purpose was explained with the value of having involvement in treatment plan, and provided options to meet self identified goals.  Plan: Will Provide structure, support, and encouragement. Offer education on coping strategies and life management skills. Assist pt to increase self awareness regarding mental health issues and expand network of stress management resources. Assess further.            "

## 2017-12-27 NOTE — PROGRESS NOTES
"Pt reports vaginal odor- \"like when I had trichomonas\" \"recent new sex partner\"  Denies discharge at this time.  Requested that IM be informed.  Paged PA on call to report above.    "

## 2017-12-27 NOTE — PLAN OF CARE
Problem: General Plan of Care (Inpatient Behavioral)  Goal: Individualization/Patient Specific Goal (IP Behavioral)  The patient and/or their representative will achieve their patient-specific goals related to the plan of care.    The patient-specific goals include:      Reasons you are in the hospital:  1)  I was drinking to much  2)  Suicidal ideation    Goals for Discharge:  1)  Would like inpatient CD treatment

## 2017-12-27 NOTE — PROGRESS NOTES
MSSA @ 0200-4. No Valium given.  BP-181-105. House Officer notified. Apresoline 25-50 mg. precribed PRN for BP >160. Apresoline 25 mg. given @ 0207.     MSSA @ 0540-9. Valium 5 mg. given per MSSA protocol. BP-158/97, P-76, R-16 and T- 98.0. Minimal tremors and diaphoresis noted. No hallucinations and agitation noted. Slept 6 hours.

## 2017-12-27 NOTE — PROGRESS NOTES
A                  12/26/17 2246   Patient Belongings   Did you bring any home meds/supplements to the hospital?  Yes   Disposition of meds  Sent to security/pharmacy per site process   Patient Belongings clothing;cell phone/electronics;keys;ring;shoes;purse;wallet;glasses;other (see comments)   Disposition of Belongings Kept with patient;Locker;Sent to security per site process   Belongings Search Yes   General Info Comment Envelope in security   Admission:12/26/2017  Items in pt's room: A pair of shoes, personal clothing, and a pair of glasses.    Items in pt's locker: A purse x 2, a wallet, a cell phone, a , a winter jacket, a ring and   a winter cap    Items sent to security: ( Env #[id number]), [place] 's, [facility] bank check book, # [phone]:    I am responsible for any personal items that are not sent to the safe or pharmacy.  [facility] is not responsible for loss, theft or damage of any property in my possession.    Signature:  _________________________________ Date: _______  Time: _____                                              Staff Signature:  ____________________________ Date: ________  Time: _____      2nd Staff person, if patient is unable/unwilling to sign:    Signature: ________________________________ Date: ________  Time: _____     Discharge:  [facility] has returned all of my personal belongings:    Signature: _________________________________ Date: ________  Time: _____                                          Staff Signature:  ____________________________ Date: ________  Time: _____

## 2017-12-27 NOTE — PROGRESS NOTES
"Patient participated actively in 60 minute \"coping skills Prudence group\" wherein she and other participants used the fame of Prudence to identify personal coping skills at their disposal that they can use in situations which are challenging.    "

## 2017-12-27 NOTE — ED PROVIDER NOTES
History     Chief Complaint   Patient presents with     Depression     last several days has had increased depression, suicide thoughts. has been drinking 6-8 drinks daily and taking 200 trazadone to sleep (should be on 150)     Suicidal     Patient is a 62 year old female presenting with Depression:. The history is provided by the patient and medical records.   Depression       Taty Kearney is a 62 year old female who is here brought in by daughter-in-law. Patient has history of depression and chemical dependence. She gets psychiatric care through [facility] [place]. She last saw her provider last month. There was discussion about getting patient into CD treatment. She was preferring to try an outpatient program. She saw her primary care provider last week. She was reporting intent on pursuing an outpatient CD assessment that was coming up. Patient in the meantime is continuing to drink, usually going through 6-8 drinks daily. She decided that she would like inpatient CD treatment. They called [facility] today and this was in process. Patient was brought here believing that she will get into inpatient CD treatment faster. She reports that she is feeling suicidal. She has never been hospitalized psychiatrically. She has been taking higher than her prescribed trazodone dose to help with sleeping. Patient admits that she may be trying to overdose, as it is her suicidal plan. Patient breathalized 0.11 on arrival. She last drank this afternoon. She denies history of withdrawal seizures.     Patient denies acute medical concerns. She has history of gastrectomy in 2013 to remove a neuroendocrine tumor. She has chronic kidney disease. She has history of polysubstance abuse.    Please see [facility] Crisis Assessment on 12/26/17 in [facility] for further details.    PERSONAL MEDICAL HISTORY  Past Medical History:   Diagnosis Date     Depression      Ectopic pregnancy      ETOH abuse 2005 and 1999    treatment for both ETOH and crack      History of tubal ligation      HTN      Hypertension      Menopause      Pancreatitis, acute     recurrent 2008     Recurrent HSV (herpes simplex virus)      Tobacco abuse      Unspecified drug dependence, unspecified      Unspecified viral hepatitis C without hepatic coma      PAST SURGICAL HISTORY  Past Surgical History:   Procedure Laterality Date     BREAST BIOPSY, RT/LT      right only benign     C/SECTION, CLASSICAL  three         TUBAL LIGATION       FAMILY HISTORY  Family History   Problem Relation Age of Onset     DIABETES Maternal Grandmother      DIABETES Maternal Aunt      Blood Disease Brother      aids     Blood Disease Son      hiv     SOCIAL HISTORY  Social History   Substance Use Topics     Smoking status: Current Every Day Smoker     Packs/day: 1.00     Types: Cigarettes     Smokeless tobacco: Never Used     Alcohol use No      Comment: quit x 2.5 months ago gone back to AA     MEDICATIONS  No current facility-administered medications for this encounter.      Current Outpatient Prescriptions   Medication     Pyridoxine HCl (VITAMIN B6 PO)     Tiotropium Bromide Monohydrate (SPIRIVA HANDIHALER IN)     oxyCODONE-acetaminophen (PERCOCET) 5-325 MG per tablet     sennosides (SENOKOT) 8.6 MG tablet     morphine (MSIR) 15 MG tablet     oxyCODONE-acetaminophen (PERCOCET) 5-325 MG per tablet     carvedilol (COREG) 25 MG tablet     cetirizine (ZYRTEC) 10 MG tablet     cyclobenzaprine (FLEXERIL) 10 MG tablet     ferrous sulfate 325 (65 FE) MG tablet     gabapentin (GRALISE) 300 MG 24hr Ext Release tab     cyanocobalamin 1000 MCG/ML injection     disulfiram (ANTABUSE) 250 MG tablet     Cholecalciferol (VITAMIN D) 2000 UNIT CAPS     PARoxetine (PAXIL) 40 MG tablet     atenolol (TENORMIN) 50 MG tablet     diltiazem (TIAZAC) 360 MG 24 hr SA capsule     albuterol (PROAIR HFA) 108 (90 BASE) MCG/ACT inhaler     ALLERGIES  No Known Allergies      I have reviewed the Medications, Allergies, Past Medical and  Surgical History, and Social History in the Epic system.    Review of Systems   Constitutional: Negative.    HENT: Negative.    Eyes: Negative.    Respiratory: Negative.    Cardiovascular: Negative.    Gastrointestinal: Negative.    Endocrine: Negative.    Genitourinary: Negative.    Musculoskeletal: Negative.    Skin: Negative.    Neurological: Negative.    Hematological: Negative.    Psychiatric/Behavioral: Positive for decreased concentration, sleep disturbance and suicidal ideas. Negative for hallucinations. The patient is nervous/anxious.    All other systems reviewed and are negative.      Physical Exam   BP: (!) 169/107  Pulse: 94  Temp: 99.4  F (37.4  C)  Resp: 14  Weight: 68 kg (150 lb)  SpO2: 98 %      Physical Exam   Constitutional: She appears well-developed and well-nourished.   HENT:   Head: Normocephalic.   Eyes: Pupils are equal, round, and reactive to light.   Neck: Normal range of motion.   Cardiovascular: Normal rate.    Pulmonary/Chest: Effort normal.   Abdominal: Soft.   Musculoskeletal: Normal range of motion.   Neurological: She is alert.   Skin: Skin is warm.   Psychiatric: Her speech is normal. Judgment normal. Her mood appears anxious. Her affect is blunt. She is withdrawn. She is not agitated, not aggressive, not hyperactive, not actively hallucinating and not combative. Thought content is not paranoid and not delusional. Cognition and memory are normal. She exhibits a depressed mood. She expresses suicidal ideation. She expresses no homicidal ideation. She is inattentive.   Nursing note and vitals reviewed.      ED Course     ED Course     Procedures      Labs Ordered and Resulted from Time of ED Arrival Up to the Time of Departure from the ED   DRUG ABUSE SCREEN 6 CHEM DEP URINE ([facility]) - Abnormal; Notable for the following:        Result Value    Ethanol Qual Urine Positive (*)     All other components within normal limits   ALCOHOL BREATH TEST POCT - Abnormal; Notable for the following:  "    Alcohol Breath Test 0.110 (*)     All other components within normal limits            Assessments & Plan (with Medical Decision Making)   Patient with history of depression and alcohol abuse. She is unable to control her drinking and now wants \"inpatient treatment.\" She reports feeling suicidal. She is referred for psychiatric admission for stabilization. She is voluntary.    I have reviewed the nursing notes.    I have reviewed the findings, diagnosis, plan and need for follow up with the patient.    New Prescriptions    No medications on file       Final diagnoses:   Moderate episode of recurrent major depressive disorder (H)   Suicidal ideation   Alcohol dependence with alcohol-induced mood disorder (H)       12/26/2017   [facility], [place], EMERGENCY DEPARTMENT     Lenard Malin MD  12/26/17 2050    "

## 2017-12-27 NOTE — H&P
"DATE OF SERVICE:  12/26/2017      Ms. Taty Kearney, date of birth 1955, is a 62-year-old woman admitted to the [facility] Psychiatry unit on 12/26/2017.  She was admitted to the hospital to treat depression with plans to overdose on trazodone in the context of alcohol dependency.  She has had increased depression for the past 1-2 weeks, correlating with the holiday season.  She notes her mood has been \"low, low, low.\"      She has been taking Paxil for over 10 years, along with trazodone with limited results.      Psychiatric history is noted in addition to the depression for alcohol use.  She estimates she has had 4-5 previous chemical dependency treatments, and she has currently been drinking for about 4 months, having done treatment in the spring followed by chemical dependency and mental Health Dual Diagnosis outpatient treatment at [facility].  She is currently drinking a 6-pack plus \"2 shooters of yamile per day.\"  Alcohol level was 0.110.  She has taken Campral and naltrexone, one of these affected her kidneys and the other was simply not effective.      She would like to go to treatment again.      Depression onset was a few years ago.  She has been on Paxil for many years, over 10, and again notes it has not been terribly helpful.  She does wonder if she has bipolar illness because of mood swings and mood lability.  She also has spells where people tell she talks too much or talks too fast.      MEDICAL HISTORY:  Noted for stomach cancer.  She also suffers from hypertension.      MENTAL STATUS EXAMINATION:  Shows an alert woman lying in bed.  Speech production is decreased.  Affect is sad.  Mood is moderately depressed.  There are no signs of psychosis.  She believes she will be safe with the support of the hospital.  She did receive Valium today for alcohol withdrawal.      Associations cognitions are intact and speech production is decreased.  There is a " history of opiates and noting she has been off of these for 1 year.      DIAGNOSTIC IMPRESSION:   Axis I:  Bipolar illness, type 2, depressed.   II:  Posttraumatic stress disorder.   III:  Alcohol dependence and withdrawal (alcohol use disorders, severe with withdrawal).      PLAN:  Plan at this time is to stop the trazodone and start Depakote.  Paxil will switch to Cymbalta.  Estimated length of stay is 10 days.  Discharge criteria is manageability and safety for treatment.  Minipress will be started for posttraumatic stress disorder as she has nightmares of seeing dead people that are disturbing for her.         LORI JENKINS MD             D: 2017 08:53   T: 2017 09:12   MT:       Name:     ABILIO ENNIS   MRN:      [id number]        Account:      [id number]   :      1955           Admitted:     [id number]      Document: [id number]

## 2017-12-27 NOTE — PHARMACY-ADMISSION MEDICATION HISTORY
Admission Medication History status for the 12/26/2017 admission is complete.  See EPIC admission navigator for Prior to Admission medications.    Medication history sources:  Patient, [facility] records    Medication history source reliability: Moderate; patient knew her medications and most of her doses, tried to confirm most doses with [facility] records, but paroxetine dose did not match as patient reported    Medication adherence:  Moderate; patient reports she hasn't taken her medications for a couple days, but reports she usually only forgets all her medications once a week    Changes made to PTA medication list (reason)  Added:   - Potassium chloride (K-Dur) 20 mEq [facility]  - Gabapentin 600 mg po TID PRN per patient and [facility] records  - Trazodone 150 mg po qHS per patient and [facility] records  - Chlorthalidone 12.5 mg po daily per patient and [facility] records  Deleted:   - Atenolol, cetirizine, cyclobenzaprine, disulfiram, gabapentin ER, morphine, Percocet, sennosides; per patient she is not taking, not listed in [facility] records  Changed:   - Paroxetine CR 25 mg po daily per patient; updated from paroxetine 40 mg po daily. Unable to confirm with patient's pharmacy, but patient was confident she is taking 25 mg.  - Tiotropium 18 mcg PRN daily per patient; patient reports she should be using daily, but is only using as needed  - Vitamin D 1000 units po daily; updated dose  - Ferrous sulfate 325 mg po daily per patient; updated dose    Additional medication history information (including reliability of information, actions taken by pharmacist):   - Patient seemed to know her medications and was able to confirm her doses from [facility] records.  - There was a discrepancy between her report and [facility]'s paroxetine formulation and dose, but patient was adamant her dose is one tablet and is 25 mg (only supplied in CR). May want to confirm with [facility]- Mariah Ocampo tomorrow.  - Patient is not taking Spiriva  as prescribed and only using when she needs it.    Time spent in this activity: 30 minutes    Medication history completed by: Josi Kearns PharmD    Prior to Admission medications    Medication Sig Last Dose Taking? Auth Provider   VITAMIN D, CHOLECALCIFEROL, PO Take 1,000 Units by mouth daily Past Week Yes Unknown, Entered By History   tiotropium (SPIRIVA) 18 MCG capsule Inhale 18 mcg into the lungs daily as needed Past Month Yes Unknown, Entered By History   Potassium Chloride Ana ER (K-DUR PO) Take 20 mEq by mouth daily Past Week Yes Unknown, Entered By History   GABAPENTIN PO Take 600 mg by mouth 3 times daily as needed  Yes Unknown, Entered By History   TRAZODONE HCL PO Take 150 mg by mouth At Bedtime Past Week Yes Unknown, Entered By History   CHLORTHALIDONE PO Take 12.5 mg by mouth daily Past Week Yes Unknown, Entered By History   PARoxetine (PAXIL-CR) 25 MG 24 hr tablet Take 25 mg by mouth every morning Past Week Yes Unknown, Entered By History   Pyridoxine HCl (VITAMIN B6 PO) Take 25 mg by mouth daily  Past Week Yes Reported, Patient   carvedilol (COREG) 25 MG tablet Take 25 mg by mouth 2 times daily (with meals) Past Week Yes Reported, Patient   ferrous sulfate 325 (65 FE) MG tablet Take 325 mg by mouth daily (with breakfast)  Past Week Yes Reported, Patient   cyanocobalamin 1000 MCG/ML injection Inject 1 mL into the muscle every 30 days Past Month Yes Reported, Patient   diltiazem (TIAZAC) 360 MG 24 hr SA capsule Take 1 capsule by mouth daily. Past Week Yes Anais Andrade MD   albuterol (PROAIR HFA) 108 (90 BASE) MCG/ACT inhaler Inhale 2 puffs into the lungs every 6 hours as needed for shortness of breath / dyspnea. Past Week Yes Anais Andrade MD   Tiotropium Bromide Monohydrate (SPIRIVA HANDIHALER IN)    Reported, Patient

## 2017-12-27 NOTE — PLAN OF CARE
Problem: General Plan of Care (Inpatient Behavioral)  Goal: Team Discussion  Team Plan:   BEHAVIORAL TEAM DISCUSSION    Participants: Dr. Billingsley, Alfonzo Coppola RN, [facility]xt[facility], Jo Ambrose, OT  Progress: New admit  Continued Stay Criteria/Rationale: Depression   Medical/Physical:See medical notes  Precautions:   Behavioral Orders   Procedures     Code 1 - Restrict to Unit     Fall precautions     Routine Programming     As clinically indicated     Status 15     Every 15 minutes.     Suicide precautions     Withdrawal precautions     Plan: The plan is to assess the patient for mental health and medication needs.  The patient will be prescribed  medications to treat the identified symptoms.  Upon discharge the patient will be referred to services as   appropriate based on the assessment.  Rationale for change in precautions or plan: N/A

## 2017-12-27 NOTE — ED NOTES
Daughter and daughter in law are present; patient  Experiencing increased depression and has not been taking meds while drinking, including her hypertension med

## 2017-12-28 PROCEDURE — A9270 NON-COVERED ITEM OR SERVICE: HCPCS | Mod: GY | Performed by: PSYCHIATRY & NEUROLOGY

## 2017-12-28 PROCEDURE — 97150 GROUP THERAPEUTIC PROCEDURES: CPT | Mod: GO

## 2017-12-28 PROCEDURE — 12400007 ZZH R&B MH INTERMEDIATE UMMC

## 2017-12-28 PROCEDURE — 25000132 ZZH RX MED GY IP 250 OP 250 PS 637: Mod: GY | Performed by: NURSE PRACTITIONER

## 2017-12-28 PROCEDURE — 90853 GROUP PSYCHOTHERAPY: CPT

## 2017-12-28 PROCEDURE — 25000132 ZZH RX MED GY IP 250 OP 250 PS 637: Mod: GY | Performed by: PSYCHIATRY & NEUROLOGY

## 2017-12-28 PROCEDURE — 99232 SBSQ HOSP IP/OBS MODERATE 35: CPT | Performed by: PSYCHIATRY & NEUROLOGY

## 2017-12-28 PROCEDURE — A9270 NON-COVERED ITEM OR SERVICE: HCPCS | Mod: GY | Performed by: NURSE PRACTITIONER

## 2017-12-28 RX ORDER — TRAZODONE HYDROCHLORIDE 50 MG/1
50 TABLET, FILM COATED ORAL AT BEDTIME
Status: DISCONTINUED | OUTPATIENT
Start: 2017-12-28 | End: 2017-12-29

## 2017-12-28 RX ADMIN — CARVEDILOL 25 MG: 25 TABLET, FILM COATED ORAL at 10:29

## 2017-12-28 RX ADMIN — CARVEDILOL 25 MG: 25 TABLET, FILM COATED ORAL at 19:00

## 2017-12-28 RX ADMIN — TRAZODONE HYDROCHLORIDE 50 MG: 50 TABLET ORAL at 21:37

## 2017-12-28 RX ADMIN — POTASSIUM CHLORIDE 20 MEQ: 20 TABLET, EXTENDED RELEASE ORAL at 09:01

## 2017-12-28 RX ADMIN — Medication 25 MG: at 08:59

## 2017-12-28 RX ADMIN — VITAMIN D, TAB 1000IU (100/BT) 1000 UNITS: 25 TAB at 08:59

## 2017-12-28 RX ADMIN — FERROUS SULFATE TAB 325 MG (65 MG ELEMENTAL FE) 325 MG: 325 (65 FE) TAB at 08:59

## 2017-12-28 RX ADMIN — HYDROXYZINE HYDROCHLORIDE 50 MG: 25 TABLET ORAL at 19:00

## 2017-12-28 RX ADMIN — DILTIAZEM HYDROCHLORIDE 360 MG: 360 CAPSULE, EXTENDED RELEASE ORAL at 10:29

## 2017-12-28 RX ADMIN — GABAPENTIN 600 MG: 600 TABLET, FILM COATED ORAL at 08:59

## 2017-12-28 RX ADMIN — DULOXETINE HYDROCHLORIDE 30 MG: 30 CAPSULE, DELAYED RELEASE ORAL at 08:59

## 2017-12-28 RX ADMIN — GABAPENTIN 600 MG: 600 TABLET, FILM COATED ORAL at 19:01

## 2017-12-28 RX ADMIN — TRAZODONE HYDROCHLORIDE 50 MG: 50 TABLET ORAL at 21:05

## 2017-12-28 RX ADMIN — Medication 12.5 MG: at 10:29

## 2017-12-28 RX ADMIN — NICOTINE 1 PATCH: 21 PATCH, EXTENDED RELEASE TRANSDERMAL at 09:00

## 2017-12-28 RX ADMIN — GABAPENTIN 600 MG: 600 TABLET, FILM COATED ORAL at 14:30

## 2017-12-28 RX ADMIN — DIVALPROEX SODIUM 500 MG: 500 TABLET, DELAYED RELEASE ORAL at 19:02

## 2017-12-28 ASSESSMENT — ACTIVITIES OF DAILY LIVING (ADL)
ORAL_HYGIENE: INDEPENDENT
DRESS: INDEPENDENT
GROOMING: INDEPENDENT
GROOMING: INDEPENDENT
DRESS: INDEPENDENT
ORAL_HYGIENE: INDEPENDENT

## 2017-12-28 NOTE — PROGRESS NOTES
Pt blood pressure noted to be on low WNL: [id number] and standing 104/77. With pulses of 73. Monie N/NP was webpaged with request to place parameters on coreg 25 mg, diltiazem 360 mg and hygroton 12.5 mg.    Parameters received. Blood pressure rechecked (135/85 and 108/79) with pulse of 78. B/P medications were administered.

## 2017-12-28 NOTE — PLAN OF CARE
Problem: General Plan of Care (Inpatient Behavioral)  Goal: Individualization/Patient Specific Goal (IP Behavioral)  The patient and/or their representative will achieve their patient-specific goals related to the plan of care.    The patient-specific goals include:   Illness Management Recovery model:  Feedback.    Patient identified the following people they would like to receive feedback from if/when they see early warning signs:        Family(s): kids    Support Group Member(s): therapist

## 2017-12-28 NOTE — PLAN OF CARE
Problem: Suicidal Behavior (Adult)  Goal: Suicidal Behavior is Absent/Minimized/Managed  Outcome: Improving  Patient denies SI/SIB.  Calm, quiet, pleasant, cooperative.  Visible in the lounge and is social with peers.  States she is wanting CD treatment at this time.  MSSA scores are low 4-2. C/o anxiety this afternoon and received vistaril which she thought helped.  P:  Continue to monitor.

## 2017-12-28 NOTE — PROGRESS NOTES
[facility] No withdrawal signs and symptoms noted. Slept the whole shift.

## 2017-12-28 NOTE — PROGRESS NOTES
Brief f/u medicine note:    BP trend last 24 hours if reassuring. No need for further IM following.     Please page the internal medicine job code pager if intercurrent medical issues arise during this hospitalization. Thank you for the involving me in the care of this patient.   Monie Good CNP  [facility]

## 2017-12-28 NOTE — PROGRESS NOTES
"   12/28/17 1400   General Information   Special Considerations completed on 12-28-17   Clinical Impression   Affect Appropriate to situation   Orientation Oriented to person, place and time   Appearance and ADLs General cleanliness observed in most areas   Attention to Internal Stimuli No observed signs   Interaction Skills Initiates appropriately with staff;Initiates appropriately with peers   Ability to Communicate Needs Independent   Verbal Content Clear;Appropriate to topic   Ability to Maintain Boundaries Maintains appropriate physical boundaries;Maintains appropriate verbal boundaries   Participation Initiates participation   Concentration Concentrates 50 minutes   Ability to Concentrate Without difficulty   Follows and Comprehends Directions Independently follows 2 step verbal directions   Memory Delayed and immediate recall intact;Needs further assessment   Organization Independently organizes medium tasks   Decision Making Independent   Planning and Problem Solving Independently plans ahead   Ability to Apply and Learn Concepts Applies within group structure   Frustrations / Stress Tolerance Independently identifies sources of frustration/stress   Level of Insight Insightful into needs, issues, goals   Self Esteem Can identify positives   Social Supports Has knowledge of support systems   General Observation/Plan   General Observations/Plan See Comments   Attended 2 of 2 OT groups. She is pleasant, initiates speaking up first with answers during activity focused on Progress experienced since admission. She stated not feeling better yet. Stated concerns about having difficulty with filling out the Rule 25 paperwork and may need to ask for assistance. \"I can't read as well as I want to\". \"I want to work on assertiveness.\" She stated music to be very helpful with calming. She worked on a creative step task, planned and organized ideas and followed through. She stated reason for admission as \"for help daily " "living\". She chose the goal focus to deal with frustration more effectively, manage time better, improve grooming and ask for help as needed..Pt was given and completed a written self assessment. OT purpose was explained with the value of having involvement in treatment plan, and provided options to meet self identified goals. Plan: Provide structure, support, and encouragement through attendance to groups. Assist pt to increase self awareness regarding expanding helpful coping strategies. Encourage asking assistance as needed and initiate interaction each group for practice in assertiveness. Assess further. Provide successful experiences in reinforcing positive self esteem.        "

## 2017-12-28 NOTE — PROGRESS NOTES
"[facility], [place]   Psychiatric Progress Note        Interim History:   The patient's care was discussed with the treatment team during the daily team meeting and/or staff's chart notes were reviewed.  Staff report patient has been using good coping strategies in group.     The patient reports that she is feeling sad. Does have some SI but no plan and feels safe here on the unit. Has started working on Rule 25 paperwork but feels a little overwhelmed. Discussed doing what she can and asking for help if needed. Did not sleep well due to nightmares last night and would like to be back on the Trazodone instead of the Minipress. Appetite is poor. Tolerating medication changes well. Denies any current AH but says that she was hearing voices about four days ago.          Medications:       traZODone  50 mg Oral At Bedtime     divalproex  500 mg Oral Daily at 8 pm     DULoxetine  30 mg Oral Daily     gabapentin (NEURONTIN) tablet 600 mg  600 mg Oral TID     carvedilol  25 mg Oral BID w/meals     chlorthalidone (HYGROTON) half-tab 12.5 mg  12.5 mg Oral Daily     [START ON 1/20/2018] cyanocobalamin  1,000 mcg Intramuscular Q30 Days     diltiazem  360 mg Oral Daily     ferrous sulfate  325 mg Oral Daily with breakfast     potassium chloride SA (K-DUR/KLOR-CON M) CR tablet 20 mEq  20 mEq Oral Daily     pyridOXINE  25 mg Oral Daily     cholecalciferol  1,000 Units Oral Daily     nicotine   Transdermal Q8H     nicotine   Transdermal Daily     nicotine  1 patch Transdermal Daily          Allergies:   No Known Allergies       Labs:   No results found for this or any previous visit (from the past 24 hour(s)).       Psychiatric Examination:     /70  Pulse 60  Temp 98.1  F (36.7  C) (Tympanic)  Resp 16  Ht 1.651 m (5' 5\")  Wt 66.2 kg (146 lb)  SpO2 94%  BMI 24.3 kg/m2  Weight is 146 lbs 0 oz  Body mass index is 24.3 kg/(m^2).  Orthostatic Vitals       Most Recent      Sitting Orthostatic BP " 135/85 12/28 1112    Sitting Orthostatic Pulse (bpm) 78 12/28 1112    Standing Orthostatic /79 12/28 1112    Standing Orthostatic Pulse (bpm) 78 12/28 1112            Appearance: awake, alert and adequately groomed  Attitude:  cooperative  Eye Contact:  good  Mood:  sad   Affect:  mood congruent  Speech:  clear, coherent  Psychomotor Behavior:  no evidence of tardive dyskinesia, dystonia, or tics  Thought Process:  linear  Associations:  no loose associations  Thought Content:  passive suicidal ideation present, no current AH  Insight:  fair  Judgement:  fair  Oriented to:  time, person, and place  Attention Span and Concentration:  fair  Recent and Remote Memory:  fair         Precautions:     Behavioral Orders   Procedures     Code 1 - Restrict to Unit     Fall precautions     Routine Programming     As clinically indicated     Status 15     Every 15 minutes.     Suicide precautions          DIagnoses:     Axis I:  Bipolar illness, type 2, depressed.   II:  Posttraumatic stress disorder.   III:  Alcohol dependence and withdrawal (alcohol use disorders, severe with withdrawal).          Plan:     1) Continue VPA 500mg Qhs.   2) Paxil was changed to Cymbalta 30mg Qday.   3) Stop Prazosin and restart Trazodone 50-100mg Qhs.   4) Stop MSSA.   5) Disposition likely to CD treatment. Patient working on Rule 25 paperwork. Patient to show improvement in terms of mood prior to discharge.

## 2017-12-28 NOTE — PROGRESS NOTES
Pt stated she feels very depressed and anxious, mainly in regards to paperwork she needs help completing. Pt stated she's had some thoughts of SIB, no plan. Pt present in milieu, somewhat withdrawn though will socialize upon approach. Pt denied SI and hallucinations.       12/28/17 1511   Behavioral Health   Hallucinations denies / not responding to hallucinations   Thinking distractable   Orientation person: oriented;place: oriented;date: oriented;time: oriented   Memory baseline memory   Insight poor   Judgement impaired   Eye Contact at examiner;out of corner of eyes   Affect irritable;full range affect   Mood mood is calm;depressed;anxious   Physical Appearance/Attire attire appropriate to age and situation   Hygiene well groomed   Suicidality other (see comments)  (Denies)   Self Injury thoughts only   Elopement (None)   Activity other (see comment)  (Present in milieu)   Speech clear;coherent   Medication Sensitivity no stated side effects;no observed side effects   Psychomotor / Gait balanced;steady   Safety   Suicidality Status 15   Psycho Education   Type of Intervention 1:1 intervention   Response participates, initiates socially appropriate   Hours 0.5   Treatment Detail Check-in   Activities of Daily Living   Hygiene/Grooming independent   Oral Hygiene independent   Dress independent   Room Organization independent   Activity   Activity Assistance Provided independent

## 2017-12-29 PROCEDURE — 25000132 ZZH RX MED GY IP 250 OP 250 PS 637: Mod: GY | Performed by: NURSE PRACTITIONER

## 2017-12-29 PROCEDURE — 99232 SBSQ HOSP IP/OBS MODERATE 35: CPT | Performed by: PSYCHIATRY & NEUROLOGY

## 2017-12-29 PROCEDURE — 25000132 ZZH RX MED GY IP 250 OP 250 PS 637: Mod: GY | Performed by: PSYCHIATRY & NEUROLOGY

## 2017-12-29 PROCEDURE — A9270 NON-COVERED ITEM OR SERVICE: HCPCS | Mod: GY | Performed by: PSYCHIATRY & NEUROLOGY

## 2017-12-29 PROCEDURE — A9270 NON-COVERED ITEM OR SERVICE: HCPCS | Mod: GY | Performed by: NURSE PRACTITIONER

## 2017-12-29 PROCEDURE — 12400007 ZZH R&B MH INTERMEDIATE UMMC

## 2017-12-29 RX ORDER — TRAZODONE HYDROCHLORIDE 150 MG/1
150 TABLET ORAL AT BEDTIME
Status: DISCONTINUED | OUTPATIENT
Start: 2017-12-29 | End: 2018-01-04 | Stop reason: HOSPADM

## 2017-12-29 RX ADMIN — DILTIAZEM HYDROCHLORIDE 360 MG: 360 CAPSULE, EXTENDED RELEASE ORAL at 08:24

## 2017-12-29 RX ADMIN — CARVEDILOL 25 MG: 25 TABLET, FILM COATED ORAL at 18:19

## 2017-12-29 RX ADMIN — FERROUS SULFATE TAB 325 MG (65 MG ELEMENTAL FE) 325 MG: 325 (65 FE) TAB at 08:24

## 2017-12-29 RX ADMIN — GABAPENTIN 600 MG: 600 TABLET, FILM COATED ORAL at 20:08

## 2017-12-29 RX ADMIN — GABAPENTIN 600 MG: 600 TABLET, FILM COATED ORAL at 14:38

## 2017-12-29 RX ADMIN — TRAZODONE HYDROCHLORIDE 150 MG: 150 TABLET ORAL at 21:28

## 2017-12-29 RX ADMIN — Medication 25 MG: at 08:24

## 2017-12-29 RX ADMIN — HYDROXYZINE HYDROCHLORIDE 50 MG: 25 TABLET ORAL at 00:51

## 2017-12-29 RX ADMIN — POTASSIUM CHLORIDE 20 MEQ: 20 TABLET, EXTENDED RELEASE ORAL at 08:23

## 2017-12-29 RX ADMIN — Medication 12.5 MG: at 08:25

## 2017-12-29 RX ADMIN — GABAPENTIN 600 MG: 600 TABLET, FILM COATED ORAL at 08:24

## 2017-12-29 RX ADMIN — DULOXETINE HYDROCHLORIDE 30 MG: 30 CAPSULE, DELAYED RELEASE ORAL at 08:28

## 2017-12-29 RX ADMIN — CARVEDILOL 25 MG: 25 TABLET, FILM COATED ORAL at 08:24

## 2017-12-29 RX ADMIN — DIVALPROEX SODIUM 500 MG: 500 TABLET, DELAYED RELEASE ORAL at 20:08

## 2017-12-29 RX ADMIN — NICOTINE 1 PATCH: 21 PATCH, EXTENDED RELEASE TRANSDERMAL at 08:24

## 2017-12-29 RX ADMIN — VITAMIN D, TAB 1000IU (100/BT) 1000 UNITS: 25 TAB at 08:24

## 2017-12-29 ASSESSMENT — ACTIVITIES OF DAILY LIVING (ADL)
GROOMING: INDEPENDENT
ORAL_HYGIENE: INDEPENDENT
GROOMING: HANDWASHING;INDEPENDENT
DRESS: STREET CLOTHES;INDEPENDENT
ORAL_HYGIENE: INDEPENDENT
DRESS: INDEPENDENT

## 2017-12-29 NOTE — PLAN OF CARE
"Problem: Suicidal Behavior (Adult)  Goal: Suicidal Behavior is Absent/Minimized/Managed    Pt stated her mood was \"very hopeful\" this morning, after talking with CM. Tentative plan is for her to go to tx at [facility]. She denies si; rates her depression and anxiety both 7. She is smiling, calm, attending grps, social in the milieu. Her appetite is \"picking up,\" her sleep was \"good\" last night. She said \"I drift off a bit,\" when asked how her concentration was. Her goal for the day is \"to stay hopeful.\" Pt had a bout of weakness, also \"a little dizzy and nauseated; I get these spells, sometimes.\" She ate bkfst, and is drinking fluids. Her BP when checked was 173/111, HR 79. Dr Hope was paged; in the mean time, Margie Roberts from internal med was notified. To recheck BP in about an hour; after pt rests. She is in her room; given cold water, ginger ale, and crackers, per her request. Will cont to monitor.      "

## 2017-12-29 NOTE — PLAN OF CARE
Problem: General Plan of Care (Inpatient Behavioral)  Goal: Team Discussion  Team Plan:    BEHAVIORAL TEAM DISCUSSION    Participants: Liana Levy RN, MARISOL Patel, Jo Ambrose OT  Progress: Some improvement.  Continued Stay Criteria/Rationale: Depression  Medical/Physical: See medical notes  Precautions:   Behavioral Orders   Procedures     Code 1 - Restrict to Unit     Fall precautions     Routine Programming     As clinically indicated     Status 15     Every 15 minutes.     Suicide precautions     Plan: Pt would like to discharge to inpatient CD treatment. She is interested in [facility]. Referral has been made.  Rationale for change in precautions or plan: N/A      Problem: Patient Care Overview  Goal: Team Discussion  Team Plan:    BEHAVIORAL TEAM DISCUSSION    Participants: Liana Levy RN, MARISOL Patel, Jo Ambrose, ABHIJIT  Progress: Some improvement.  Continued Stay Criteria/Rationale: Depression  Medical/Physical: See medical notes  Precautions:   Behavioral Orders   Procedures     Code 1 - Restrict to Unit     Fall precautions     Routine Programming     As clinically indicated     Status 15     Every 15 minutes.     Suicide precautions     Plan: Pt would like to discharge to inpatient CD treatment. She is interested in [facility]. Referral has been made.  Rationale for change in precautions or plan: N/A

## 2017-12-29 NOTE — PROGRESS NOTES
Patient was having trouble sleeping and requested for a medication that would help her sleep. Hydroxyzine 50 mg given @ 0051 PRN for anxiety. Slept 6 hours.

## 2017-12-29 NOTE — PROGRESS NOTES
Calm, quiet,pleasant, cooperative.  Appears sad.  Denies SI/SIB.  Rates depression at 7/10, anxiety at 8/10.  Visible in the lounge, watching movie with peers.  P:  Continue to monitor.    Patient had small emesis while this writer was in her room.  Patient states she has been having them off and on all day but did not mention it to anyone.  Patient believes it was from overeating today.    Vistaril given for anxiety, trazadone given for sleep.

## 2017-12-29 NOTE — PROGRESS NOTES
"[facility], [place]   Psychiatric Progress Note        Interim History:   The patient's care was discussed with the treatment team during the daily team meeting and/or staff's chart notes were reviewed.  Staff report patient has been reporting depression at 7/10 and anxiety at 8/10. Did have a small emesis and was dizzy.     The patient reports that she is feeling hopeful after she got the Rule 25 paperwork completed. Mood is better. Slept better but says that she does well on 150mg of Trazodone. Denies any SI. Still having \"a little bit of sweats\" and feels that she might have some lingering withdrawal symptoms.          Medications:       traZODone  150 mg Oral At Bedtime     divalproex  500 mg Oral Daily at 8 pm     DULoxetine  30 mg Oral Daily     gabapentin (NEURONTIN) tablet 600 mg  600 mg Oral TID     carvedilol  25 mg Oral BID w/meals     chlorthalidone (HYGROTON) half-tab 12.5 mg  12.5 mg Oral Daily     [START ON 1/20/2018] cyanocobalamin  1,000 mcg Intramuscular Q30 Days     diltiazem  360 mg Oral Daily     ferrous sulfate  325 mg Oral Daily with breakfast     potassium chloride SA (K-DUR/KLOR-CON M) CR tablet 20 mEq  20 mEq Oral Daily     pyridOXINE  25 mg Oral Daily     cholecalciferol  1,000 Units Oral Daily     nicotine   Transdermal Q8H     nicotine   Transdermal Daily     nicotine  1 patch Transdermal Daily          Allergies:   No Known Allergies       Labs:   No results found for this or any previous visit (from the past 24 hour(s)).       Psychiatric Examination:     /84  Pulse 71  Temp 97.9  F (36.6  C)  Resp 16  Ht 1.651 m (5' 5\")  Wt 66.2 kg (146 lb)  SpO2 94%  BMI 24.3 kg/m2  Weight is 146 lbs 0 oz  Body mass index is 24.3 kg/(m^2).  Orthostatic Vitals       Most Recent      Sitting Orthostatic /85 12/28 1112    Sitting Orthostatic Pulse (bpm) 78 12/28 1112    Standing Orthostatic /79 12/28 1112    Standing Orthostatic Pulse (bpm) 78 12/28 " 1112            Appearance: awake, alert and adequately groomed  Attitude:  cooperative  Eye Contact:  good  Mood:  better  Affect:  mood congruent  Speech:  clear, coherent  Psychomotor Behavior:  no evidence of tardive dyskinesia, dystonia, or tics  Thought Process:  linear  Associations:  no loose associations  Thought Content:  no evidence of suicidal ideation or homicidal ideation and no evidence of psychotic thought  Insight:  fair  Judgement:  fair  Oriented to:  time, person, and place  Attention Span and Concentration:  fair  Recent and Remote Memory:  fair         Precautions:     Behavioral Orders   Procedures     Code 1 - Restrict to Unit     Fall precautions     Routine Programming     As clinically indicated     Status 15     Every 15 minutes.     Suicide precautions          DIagnoses:     Axis I:  Bipolar illness, type 2, depressed.   II:  Posttraumatic stress disorder.   III:  Alcohol dependence and withdrawal (alcohol use disorders, severe with withdrawal).          Plan:     1) Continue VPA 500mg Qhs.   2) Paxil was changed to Cymbalta 30mg Qday.   3) Increase Trazodone to 150mg Qhs.   4) Disposition likely to CD treatment. Patient working on Rule 25 paperwork. Patient to show improvement in terms of mood prior to discharge.

## 2017-12-29 NOTE — PROGRESS NOTES
Pt is sleeping in her room. Staff instructed to recheck er BP/HR soon.    1131) /92 HR 77; pt resting comfortably in her room.    1420) Pt has been up and about and back in groups, after bout of not feeling well in am.

## 2017-12-29 NOTE — PLAN OF CARE
Problem: Suicidal Behavior (Adult)  Goal: Suicidal Behavior is Absent/Minimized/Managed    Attended 2 of 2 OT groups. She was quick to be involved with activity focused on using the 5 senses for calming. She initiated speaking up first, needed no cues at her turns, seemed interested, engaged and involved. Offered ideas and elaborated on comments. Smiled with interactions. During the afternoon OT group, she spent time planning and organizing steps of design, followed through and was social while working, was pleasant and supportive of others. She was attentive to detail.

## 2017-12-30 PROCEDURE — A9270 NON-COVERED ITEM OR SERVICE: HCPCS | Mod: GY | Performed by: PSYCHIATRY & NEUROLOGY

## 2017-12-30 PROCEDURE — 25000132 ZZH RX MED GY IP 250 OP 250 PS 637: Mod: GY | Performed by: PSYCHIATRY & NEUROLOGY

## 2017-12-30 PROCEDURE — 25000132 ZZH RX MED GY IP 250 OP 250 PS 637: Mod: GY | Performed by: NURSE PRACTITIONER

## 2017-12-30 PROCEDURE — A9270 NON-COVERED ITEM OR SERVICE: HCPCS | Mod: GY | Performed by: NURSE PRACTITIONER

## 2017-12-30 PROCEDURE — 12400007 ZZH R&B MH INTERMEDIATE UMMC

## 2017-12-30 RX ADMIN — CARVEDILOL 25 MG: 25 TABLET, FILM COATED ORAL at 08:26

## 2017-12-30 RX ADMIN — DULOXETINE HYDROCHLORIDE 30 MG: 30 CAPSULE, DELAYED RELEASE ORAL at 08:27

## 2017-12-30 RX ADMIN — Medication 25 MG: at 08:26

## 2017-12-30 RX ADMIN — GABAPENTIN 600 MG: 600 TABLET, FILM COATED ORAL at 14:33

## 2017-12-30 RX ADMIN — Medication 12.5 MG: at 08:26

## 2017-12-30 RX ADMIN — GABAPENTIN 600 MG: 600 TABLET, FILM COATED ORAL at 08:26

## 2017-12-30 RX ADMIN — GABAPENTIN 600 MG: 600 TABLET, FILM COATED ORAL at 21:58

## 2017-12-30 RX ADMIN — DILTIAZEM HYDROCHLORIDE 360 MG: 360 CAPSULE, EXTENDED RELEASE ORAL at 08:26

## 2017-12-30 RX ADMIN — CARVEDILOL 25 MG: 25 TABLET, FILM COATED ORAL at 17:19

## 2017-12-30 RX ADMIN — NICOTINE 1 PATCH: 21 PATCH, EXTENDED RELEASE TRANSDERMAL at 08:26

## 2017-12-30 RX ADMIN — DIVALPROEX SODIUM 500 MG: 500 TABLET, DELAYED RELEASE ORAL at 22:00

## 2017-12-30 RX ADMIN — TRAZODONE HYDROCHLORIDE 150 MG: 150 TABLET ORAL at 21:59

## 2017-12-30 RX ADMIN — VITAMIN D, TAB 1000IU (100/BT) 1000 UNITS: 25 TAB at 08:26

## 2017-12-30 RX ADMIN — FERROUS SULFATE TAB 325 MG (65 MG ELEMENTAL FE) 325 MG: 325 (65 FE) TAB at 08:26

## 2017-12-30 RX ADMIN — POTASSIUM CHLORIDE 20 MEQ: 20 TABLET, EXTENDED RELEASE ORAL at 08:33

## 2017-12-30 ASSESSMENT — ACTIVITIES OF DAILY LIVING (ADL)
DRESS: INDEPENDENT
ORAL_HYGIENE: INDEPENDENT
GROOMING: INDEPENDENT
ORAL_HYGIENE: INDEPENDENT
GROOMING: INDEPENDENT
LAUNDRY: UNABLE TO COMPLETE
DRESS: INDEPENDENT
LAUNDRY: UNABLE TO COMPLETE

## 2017-12-30 NOTE — PROGRESS NOTES
"   12/30/17 1200 Behavioral Health   Hallucinations denies / not responding to hallucinations   Thinking poor concentration   Orientation person: oriented;place: oriented;date: oriented;time: oriented   Memory baseline memory   Insight admits / accepts   Judgement impaired   Eye Contact at examiner   Affect full range affect   Mood mood is calm   Physical Appearance/Attire attire appropriate to age and situation;neat   Hygiene well groomed   Suicidality other (see comments)  (pt denies )   1. Wish to be Dead No   2. Non-Specific Active Suicidal Thoughts  No   3. Active Sucidal Ideation with any Methods (Not Plan) Without Intent to Act  No   4. Active Suicidal Ideation with Some Intent to Act, Without Specific Plan  No   5. Active Suicidal Ideation with Specific Plan and Intent  No   Self Injury other (see comment)  (pt denies)   Elopement (none)   Activity other (see comment)  (social with peers, in milieu)   Speech clear;coherent   Medication Sensitivity no stated side effects;no observed side effects   Psychomotor / Gait balanced;steady   Sander Risk Assessment   Sensory Perception 4-->no impairment   Moisture 4-->rarely moist   Activity 4-->walks frequently   Mobility 4-->no limitation   Nutrition 3-->adequate   Friction and Shear 3-->no apparent problem   Sander Score 22   Safety   Suicidality Status 15   Activities of Daily Living   Hygiene/Grooming independent   Oral Hygiene independent   Dress independent   Laundry unable to complete   Room Organization independent   Pt denies SI and SIB. Pt stated she feels anxious but she is ready to \"start taking steps forward\". Pt stated that she is here at the hospital to get better. Pt attended groups during the day and participated. Pt was visible in the milieu and was cooperative with staff.   "

## 2017-12-30 NOTE — PROGRESS NOTES
Patient reported feeling weak and generally not well.  BP systolic in the low 100's, diaphoretic.patient contiues to believe these spells are caused from the food, possibly the ensure.  BP recheck was in the 130's.  Feeling much better after short rest in her bed.

## 2017-12-31 PROCEDURE — 90853 GROUP PSYCHOTHERAPY: CPT

## 2017-12-31 PROCEDURE — A9270 NON-COVERED ITEM OR SERVICE: HCPCS | Mod: GY | Performed by: PSYCHIATRY & NEUROLOGY

## 2017-12-31 PROCEDURE — A9270 NON-COVERED ITEM OR SERVICE: HCPCS | Mod: GY | Performed by: NURSE PRACTITIONER

## 2017-12-31 PROCEDURE — 12400007 ZZH R&B MH INTERMEDIATE UMMC

## 2017-12-31 PROCEDURE — 25000132 ZZH RX MED GY IP 250 OP 250 PS 637: Mod: GY | Performed by: PSYCHIATRY & NEUROLOGY

## 2017-12-31 PROCEDURE — 25000132 ZZH RX MED GY IP 250 OP 250 PS 637: Mod: GY | Performed by: NURSE PRACTITIONER

## 2017-12-31 RX ORDER — MULTIPLE VITAMINS W/ MINERALS TAB 9MG-400MCG
1 TAB ORAL DAILY
Status: DISCONTINUED | OUTPATIENT
Start: 2017-12-31 | End: 2018-01-04 | Stop reason: HOSPADM

## 2017-12-31 RX ADMIN — CARVEDILOL 25 MG: 25 TABLET, FILM COATED ORAL at 08:38

## 2017-12-31 RX ADMIN — MULTIPLE VITAMINS W/ MINERALS TAB 1 TABLET: TAB at 10:24

## 2017-12-31 RX ADMIN — NICOTINE 1 PATCH: 21 PATCH, EXTENDED RELEASE TRANSDERMAL at 08:38

## 2017-12-31 RX ADMIN — POTASSIUM CHLORIDE 20 MEQ: 20 TABLET, EXTENDED RELEASE ORAL at 08:37

## 2017-12-31 RX ADMIN — TRAZODONE HYDROCHLORIDE 150 MG: 150 TABLET ORAL at 22:12

## 2017-12-31 RX ADMIN — FERROUS SULFATE TAB 325 MG (65 MG ELEMENTAL FE) 325 MG: 325 (65 FE) TAB at 08:38

## 2017-12-31 RX ADMIN — DILTIAZEM HYDROCHLORIDE 360 MG: 360 CAPSULE, EXTENDED RELEASE ORAL at 08:38

## 2017-12-31 RX ADMIN — DIVALPROEX SODIUM 500 MG: 500 TABLET, DELAYED RELEASE ORAL at 20:22

## 2017-12-31 RX ADMIN — Medication 12.5 MG: at 08:37

## 2017-12-31 RX ADMIN — Medication 25 MG: at 08:37

## 2017-12-31 RX ADMIN — DULOXETINE HYDROCHLORIDE 30 MG: 30 CAPSULE, DELAYED RELEASE ORAL at 08:37

## 2017-12-31 RX ADMIN — VITAMIN D, TAB 1000IU (100/BT) 1000 UNITS: 25 TAB at 08:38

## 2017-12-31 RX ADMIN — CARVEDILOL 25 MG: 25 TABLET, FILM COATED ORAL at 17:19

## 2017-12-31 RX ADMIN — GABAPENTIN 600 MG: 600 TABLET, FILM COATED ORAL at 08:38

## 2017-12-31 RX ADMIN — GABAPENTIN 600 MG: 600 TABLET, FILM COATED ORAL at 14:17

## 2017-12-31 RX ADMIN — GABAPENTIN 600 MG: 600 TABLET, FILM COATED ORAL at 20:21

## 2017-12-31 ASSESSMENT — ACTIVITIES OF DAILY LIVING (ADL)
LAUNDRY: UNABLE TO COMPLETE
GROOMING: INDEPENDENT
DRESS: INDEPENDENT
ORAL_HYGIENE: INDEPENDENT
GROOMING: INDEPENDENT
DRESS: INDEPENDENT
ORAL_HYGIENE: INDEPENDENT

## 2017-12-31 NOTE — PROGRESS NOTES
Taty  attended an OT topic group this a.m. on the topic of mindsets for the new year, emphasizing gratitude, pride, and compassion in particular. She discussed her recent history of heavy drinking and her feelings of hopefulness about pursuing treatment in the new year. Pleasant and engaged in the session.

## 2017-12-31 NOTE — PROGRESS NOTES
"Pt is rating anxiety and depression at 6/10. Pt denies SI/SIB. Pt's affect is flat. Pt reports that she continues to have nightmares/dreams of \"dead people\" which is distrubing to her. Pt reports that this began prior to admission. Pt   "

## 2017-12-31 NOTE — PLAN OF CARE
"Problem: Suicidal Behavior (Adult)  Goal: Suicidal Behavior is Absent/Minimized/Managed  Outcome: Improving  Pt was out in the milieu for most of the shift. She attended community meeting and played a game with other patients. She was social with peers and staff. Her affect was full range. She stated \"I'm doing good\". Pt denied depression, anxiety, SI, and SIB. She expressed hopefulness and motivation [facility]. She hopes to go up there sometime this week. Pt was med compliant and received no PRN medications this evening. Will continue to monitor the pt.       "

## 2018-01-01 PROCEDURE — 12400007 ZZH R&B MH INTERMEDIATE UMMC

## 2018-01-01 PROCEDURE — 25000132 ZZH RX MED GY IP 250 OP 250 PS 637: Mod: GY | Performed by: PSYCHIATRY & NEUROLOGY

## 2018-01-01 PROCEDURE — A9270 NON-COVERED ITEM OR SERVICE: HCPCS | Mod: GY | Performed by: PSYCHIATRY & NEUROLOGY

## 2018-01-01 PROCEDURE — 25000132 ZZH RX MED GY IP 250 OP 250 PS 637: Mod: GY | Performed by: NURSE PRACTITIONER

## 2018-01-01 PROCEDURE — A9270 NON-COVERED ITEM OR SERVICE: HCPCS | Mod: GY | Performed by: NURSE PRACTITIONER

## 2018-01-01 RX ADMIN — CARVEDILOL 25 MG: 25 TABLET, FILM COATED ORAL at 08:22

## 2018-01-01 RX ADMIN — DILTIAZEM HYDROCHLORIDE 360 MG: 360 CAPSULE, EXTENDED RELEASE ORAL at 08:22

## 2018-01-01 RX ADMIN — POTASSIUM CHLORIDE 20 MEQ: 20 TABLET, EXTENDED RELEASE ORAL at 08:22

## 2018-01-01 RX ADMIN — GABAPENTIN 600 MG: 600 TABLET, FILM COATED ORAL at 19:59

## 2018-01-01 RX ADMIN — GABAPENTIN 600 MG: 600 TABLET, FILM COATED ORAL at 14:17

## 2018-01-01 RX ADMIN — NICOTINE 1 PATCH: 21 PATCH, EXTENDED RELEASE TRANSDERMAL at 08:22

## 2018-01-01 RX ADMIN — DIVALPROEX SODIUM 500 MG: 500 TABLET, DELAYED RELEASE ORAL at 19:59

## 2018-01-01 RX ADMIN — TRAZODONE HYDROCHLORIDE 150 MG: 150 TABLET ORAL at 21:26

## 2018-01-01 RX ADMIN — DULOXETINE HYDROCHLORIDE 30 MG: 30 CAPSULE, DELAYED RELEASE ORAL at 08:22

## 2018-01-01 RX ADMIN — VITAMIN D, TAB 1000IU (100/BT) 1000 UNITS: 25 TAB at 08:22

## 2018-01-01 RX ADMIN — Medication 12.5 MG: at 08:22

## 2018-01-01 RX ADMIN — CARVEDILOL 25 MG: 25 TABLET, FILM COATED ORAL at 17:28

## 2018-01-01 RX ADMIN — Medication 25 MG: at 08:22

## 2018-01-01 RX ADMIN — MULTIPLE VITAMINS W/ MINERALS TAB 1 TABLET: TAB at 08:22

## 2018-01-01 RX ADMIN — FERROUS SULFATE TAB 325 MG (65 MG ELEMENTAL FE) 325 MG: 325 (65 FE) TAB at 08:22

## 2018-01-01 RX ADMIN — GABAPENTIN 600 MG: 600 TABLET, FILM COATED ORAL at 08:22

## 2018-01-01 ASSESSMENT — ACTIVITIES OF DAILY LIVING (ADL)
ORAL_HYGIENE: INDEPENDENT
GROOMING: INDEPENDENT
DRESS: INDEPENDENT
DRESS: INDEPENDENT
ORAL_HYGIENE: INDEPENDENT
LAUNDRY: UNABLE TO COMPLETE
GROOMING: INDEPENDENT

## 2018-01-01 NOTE — PROGRESS NOTES
Patient stated she feels better physically and is glad to be up and spending time with peers and attending groups

## 2018-01-01 NOTE — PLAN OF CARE
"Problem: Suicidal Behavior (Adult)  Goal: Suicidal Behavior is Absent/Minimized/Managed  Outcome: No Change  48 hour nursing assessment:  Pt evaluation continues. Assessed mood, anxiety, thoughts, and behavior. Is progressing towards goals. Encourage participation in groups and developing healthy coping skills. Pt denies auditory or visual  hallucinations. Refer to daily team meeting notes for individualized plan of care. Will continue to assess.  Patient presents with mostly flat affect, is observed this morning laughing with peers, and by this afternoon affect appears more flat. Patient states she is feeling anxious and depressed (rates both at a 7 of 10) due to thinking about \"if I'm going to treatment tomorrow, or what's going to happen\". Writer and patient discussed ways to cope with anxiety, and patient stated that reading and attending focus group is helpful. Patient denies any thoughts of suicide or self-harm. Patient is pleasant in interactions. Denies any physical concerns. Will continue to monitor and assess.       "

## 2018-01-01 NOTE — PROGRESS NOTES
"Patient reports her overall mood has improved even though her depression is at a 6/10.  Patient says she wishes her sleep would improve to the point of not waking up after having \"bad dreams\". Patient denies SI and SIB. No statements or elopement gestures to note.  "

## 2018-01-02 PROCEDURE — 25000132 ZZH RX MED GY IP 250 OP 250 PS 637: Mod: GY | Performed by: PSYCHIATRY & NEUROLOGY

## 2018-01-02 PROCEDURE — 12400007 ZZH R&B MH INTERMEDIATE UMMC

## 2018-01-02 PROCEDURE — H2032 ACTIVITY THERAPY, PER 15 MIN: HCPCS

## 2018-01-02 PROCEDURE — A9270 NON-COVERED ITEM OR SERVICE: HCPCS | Mod: GY | Performed by: PSYCHIATRY & NEUROLOGY

## 2018-01-02 PROCEDURE — A9270 NON-COVERED ITEM OR SERVICE: HCPCS | Mod: GY | Performed by: NURSE PRACTITIONER

## 2018-01-02 PROCEDURE — 25000132 ZZH RX MED GY IP 250 OP 250 PS 637: Mod: GY | Performed by: NURSE PRACTITIONER

## 2018-01-02 PROCEDURE — 97150 GROUP THERAPEUTIC PROCEDURES: CPT | Mod: GO

## 2018-01-02 PROCEDURE — 99232 SBSQ HOSP IP/OBS MODERATE 35: CPT | Performed by: PSYCHIATRY & NEUROLOGY

## 2018-01-02 RX ADMIN — MULTIPLE VITAMINS W/ MINERALS TAB 1 TABLET: TAB at 08:08

## 2018-01-02 RX ADMIN — DIVALPROEX SODIUM 500 MG: 500 TABLET, DELAYED RELEASE ORAL at 20:40

## 2018-01-02 RX ADMIN — POTASSIUM CHLORIDE 20 MEQ: 20 TABLET, EXTENDED RELEASE ORAL at 08:18

## 2018-01-02 RX ADMIN — CARVEDILOL 25 MG: 25 TABLET, FILM COATED ORAL at 08:09

## 2018-01-02 RX ADMIN — NICOTINE 1 PATCH: 21 PATCH, EXTENDED RELEASE TRANSDERMAL at 08:15

## 2018-01-02 RX ADMIN — FERROUS SULFATE TAB 325 MG (65 MG ELEMENTAL FE) 325 MG: 325 (65 FE) TAB at 08:08

## 2018-01-02 RX ADMIN — TRAZODONE HYDROCHLORIDE 150 MG: 150 TABLET ORAL at 21:57

## 2018-01-02 RX ADMIN — GABAPENTIN 600 MG: 600 TABLET, FILM COATED ORAL at 20:40

## 2018-01-02 RX ADMIN — DULOXETINE HYDROCHLORIDE 30 MG: 30 CAPSULE, DELAYED RELEASE ORAL at 08:09

## 2018-01-02 RX ADMIN — CARVEDILOL 25 MG: 25 TABLET, FILM COATED ORAL at 20:40

## 2018-01-02 RX ADMIN — GABAPENTIN 600 MG: 600 TABLET, FILM COATED ORAL at 14:17

## 2018-01-02 RX ADMIN — GABAPENTIN 600 MG: 600 TABLET, FILM COATED ORAL at 08:08

## 2018-01-02 RX ADMIN — VITAMIN D, TAB 1000IU (100/BT) 1000 UNITS: 25 TAB at 08:08

## 2018-01-02 RX ADMIN — Medication 12.5 MG: at 08:08

## 2018-01-02 RX ADMIN — Medication 25 MG: at 08:08

## 2018-01-02 RX ADMIN — DILTIAZEM HYDROCHLORIDE 360 MG: 360 CAPSULE, EXTENDED RELEASE ORAL at 08:08

## 2018-01-02 ASSESSMENT — ACTIVITIES OF DAILY LIVING (ADL)
ORAL_HYGIENE: INDEPENDENT
GROOMING: INDEPENDENT
DRESS: INDEPENDENT

## 2018-01-02 NOTE — PROGRESS NOTES
Pt. Attended 1 of 2 scheduled OT sessions today. Pt. Actively participated in goal directed task session. Organized in task.  Asked assertively for needed materials.  Worked independently.

## 2018-01-02 NOTE — PROGRESS NOTES
Spoke with Yoel Posada from [facility] who will review pt's Rule 25 to determine if she is a good fit for their program.      Update:    Spoke with Yoel who reports that due to that fact that pt has Medicare Part B, she would not be eligible for treatment at [facility]. He recommended that referral be sent to [facility] or send to the [facility] for Rule 25 funding.    Faxed referral to [facility] at [phone].  Left a message for Letty Schreiber from [facility] for additional information.    Faxed Rule 25 assessment to [facility] at [phone].    Faxed Rule 25 assessment to [facility] at [phone].    Faxed Rule 25 assessment and referral to [facility] at [phone] (attention: Taco)    Faxed referral to [place].

## 2018-01-02 NOTE — PROGRESS NOTES
"Patient has stated \"Concerns of places where she may be placed\". This writer informed CTC regarding patient wished to talk to her again. Appetite good. Social with peers. Slept 6.5 hours last night. Denies SI/SIB.   "

## 2018-01-02 NOTE — PROGRESS NOTES
"[facility], [place]   Psychiatric Progress Note        Interim History:   The patient's care was discussed with the treatment team during the daily team meeting and/or staff's chart notes were reviewed.  Staff report patient has been having some nightmares but otherwise has been doing well. Referred to [facility] [place].     The patient reports that she is feeling better. Says that it was a \"boring weekend.\" Looking forward to hopefully entering [facility] [place]. The patient reports that she is sleeping but that it doesn't feel restful. Does have energy during the day. Denies SI or HI. Denies AH or VH.          Medications:       multivitamin, therapeutic with minerals  1 tablet Oral Daily     traZODone  150 mg Oral At Bedtime     divalproex  500 mg Oral Daily at 8 pm     DULoxetine  30 mg Oral Daily     gabapentin (NEURONTIN) tablet 600 mg  600 mg Oral TID     carvedilol  25 mg Oral BID w/meals     chlorthalidone (HYGROTON) half-tab 12.5 mg  12.5 mg Oral Daily     [START ON 1/20/2018] cyanocobalamin  1,000 mcg Intramuscular Q30 Days     diltiazem  360 mg Oral Daily     ferrous sulfate  325 mg Oral Daily with breakfast     potassium chloride SA (K-DUR/KLOR-CON M) CR tablet 20 mEq  20 mEq Oral Daily     pyridOXINE  25 mg Oral Daily     cholecalciferol  1,000 Units Oral Daily     nicotine   Transdermal Q8H     nicotine   Transdermal Daily     nicotine  1 patch Transdermal Daily          Allergies:   No Known Allergies       Labs:   No results found for this or any previous visit (from the past 24 hour(s)).       Psychiatric Examination:     BP (!) 155/103  Pulse 82  Temp 97.9  F (36.6  C) (Tympanic)  Resp 16  Ht 1.651 m (5' 5\")  Wt 69.2 kg (152 lb 9.6 oz)  SpO2 100%  BMI 25.39 kg/m2  Weight is 152 lbs 9.6 oz  Body mass index is 25.39 kg/(m^2).  Orthostatic Vitals       Most Recent      Sitting Orthostatic /85 12/28 1112    Sitting Orthostatic Pulse (bpm) 78 12/28 1112    Standing " Orthostatic /79 12/28 1112    Standing Orthostatic Pulse (bpm) 78 12/28 1112            Appearance: awake, alert and adequately groomed  Attitude:  cooperative  Eye Contact:  good  Mood:  better  Affect:  mood congruent  Speech:  clear, coherent  Psychomotor Behavior:  no evidence of tardive dyskinesia, dystonia, or tics  Thought Process:  linear  Associations:  no loose associations  Thought Content:  no evidence of suicidal ideation or homicidal ideation and no evidence of psychotic thought  Insight:  fair  Judgement:  fair  Oriented to:  time, person, and place  Attention Span and Concentration:  fair  Recent and Remote Memory:  fair         Precautions:     Behavioral Orders   Procedures     Code 1 - Restrict to Unit     Fall precautions     Routine Programming     As clinically indicated     Status 15     Every 15 minutes.     Suicide precautions          DIagnoses:     Axis I:  Bipolar illness, type 2, depressed.   II:  Posttraumatic stress disorder.   III:  Alcohol dependence and withdrawal (alcohol use disorders, severe with withdrawal).          Plan:     1) Continue VPA 500mg Qhs.   2) Paxil was changed to Cymbalta 30mg Qday.   3) Continue Trazodone 150mg Qhs.   4) Disposition likely to CD treatment. Patient completed Rule 25 paperwork. Patient to show improvement in terms of mood prior to discharge.

## 2018-01-03 PROCEDURE — 90853 GROUP PSYCHOTHERAPY: CPT

## 2018-01-03 PROCEDURE — 12400007 ZZH R&B MH INTERMEDIATE UMMC

## 2018-01-03 PROCEDURE — 25000132 ZZH RX MED GY IP 250 OP 250 PS 637: Mod: GY | Performed by: PSYCHIATRY & NEUROLOGY

## 2018-01-03 PROCEDURE — 25000132 ZZH RX MED GY IP 250 OP 250 PS 637: Mod: GY | Performed by: NURSE PRACTITIONER

## 2018-01-03 PROCEDURE — A9270 NON-COVERED ITEM OR SERVICE: HCPCS | Mod: GY | Performed by: PSYCHIATRY & NEUROLOGY

## 2018-01-03 PROCEDURE — A9270 NON-COVERED ITEM OR SERVICE: HCPCS | Mod: GY | Performed by: NURSE PRACTITIONER

## 2018-01-03 PROCEDURE — 97150 GROUP THERAPEUTIC PROCEDURES: CPT | Mod: GO

## 2018-01-03 PROCEDURE — 99232 SBSQ HOSP IP/OBS MODERATE 35: CPT | Performed by: PSYCHIATRY & NEUROLOGY

## 2018-01-03 RX ADMIN — TRAZODONE HYDROCHLORIDE 150 MG: 150 TABLET ORAL at 21:30

## 2018-01-03 RX ADMIN — FERROUS SULFATE TAB 325 MG (65 MG ELEMENTAL FE) 325 MG: 325 (65 FE) TAB at 08:37

## 2018-01-03 RX ADMIN — GABAPENTIN 600 MG: 600 TABLET, FILM COATED ORAL at 14:15

## 2018-01-03 RX ADMIN — NICOTINE 1 PATCH: 21 PATCH, EXTENDED RELEASE TRANSDERMAL at 08:36

## 2018-01-03 RX ADMIN — DILTIAZEM HYDROCHLORIDE 360 MG: 360 CAPSULE, EXTENDED RELEASE ORAL at 08:36

## 2018-01-03 RX ADMIN — POTASSIUM CHLORIDE 20 MEQ: 20 TABLET, EXTENDED RELEASE ORAL at 08:38

## 2018-01-03 RX ADMIN — CARVEDILOL 25 MG: 25 TABLET, FILM COATED ORAL at 08:37

## 2018-01-03 RX ADMIN — Medication 25 MG: at 08:37

## 2018-01-03 RX ADMIN — Medication 12.5 MG: at 08:36

## 2018-01-03 RX ADMIN — GABAPENTIN 600 MG: 600 TABLET, FILM COATED ORAL at 08:37

## 2018-01-03 RX ADMIN — DIVALPROEX SODIUM 500 MG: 500 TABLET, DELAYED RELEASE ORAL at 20:43

## 2018-01-03 RX ADMIN — GABAPENTIN 600 MG: 600 TABLET, FILM COATED ORAL at 20:43

## 2018-01-03 RX ADMIN — DULOXETINE HYDROCHLORIDE 30 MG: 30 CAPSULE, DELAYED RELEASE ORAL at 08:37

## 2018-01-03 RX ADMIN — MULTIPLE VITAMINS W/ MINERALS TAB 1 TABLET: TAB at 08:36

## 2018-01-03 RX ADMIN — CARVEDILOL 25 MG: 25 TABLET, FILM COATED ORAL at 17:48

## 2018-01-03 RX ADMIN — VITAMIN D, TAB 1000IU (100/BT) 1000 UNITS: 25 TAB at 08:37

## 2018-01-03 ASSESSMENT — ACTIVITIES OF DAILY LIVING (ADL)
DRESS: INDEPENDENT
LAUNDRY: UNABLE TO COMPLETE
GROOMING: INDEPENDENT
GROOMING: INDEPENDENT
LAUNDRY: UNABLE TO COMPLETE
DRESS: INDEPENDENT
ORAL_HYGIENE: INDEPENDENT
ORAL_HYGIENE: INDEPENDENT

## 2018-01-03 NOTE — PROGRESS NOTES
Pt has been active in the milieu this evening. Social with peers, appetite good. Talked of possible referral to [facility], since [facility] does not accept her insurance. Continues to appear hopeful about CD treatment and motivated to begin this.

## 2018-01-03 NOTE — PROGRESS NOTES
"Discussed CD treatment with pt.  Explained to her that due to her insurance, she has to have Rule [place] the county which can [place] some time. Pt reports that she does not wish to return home before treatment as she is currently \"suicidal.\"    Contacted [facility] ([phone]) to determine if they have received Rule 25 assessment and whether pt was approved for funding. Was informed that they are still working on the referral and to give it another day and call back.  "

## 2018-01-03 NOTE — PROGRESS NOTES
Writer facilitated a group on new years resolutions. Pt participated and contributed to group. Pt identified goal in making her medical appointments work with her schedule.

## 2018-01-03 NOTE — PROGRESS NOTES
"   01/03/18 2225   Behavioral Health   Hallucinations denies / not responding to hallucinations   Thinking intact   Orientation person: oriented;place: oriented;date: oriented;time: oriented   Memory baseline memory   Insight admits / accepts   Judgement intact   Eye Contact at examiner   Affect sad   Mood mood is calm;anxious   Physical Appearance/Attire attire appropriate to age and situation;neat   Hygiene well groomed   Suicidality other (see comments)  (pt denies)   1. Wish to be Dead No   Self Injury other (see comment)  (pt denies)   Elopement (none)   Activity other (see comment)  (attending groups)   Speech clear;coherent   Medication Sensitivity no stated side effects;no observed side effects   Psychomotor / Gait balanced;steady   Activities of Daily Living   Hygiene/Grooming independent   Oral Hygiene independent   Dress independent   Laundry unable to complete   Room Organization independent     Pt denies SI/SIB. Denies hallucinations. She has been attending groups and has been present in milieu and social with her peers. She reports feeling anxious and rates it at a 7/10 and depressed and rates it at a 6/10. She states her mood today is \"a little better\" but still feels like she is at a \"standstill.\" She state she is feeling anxious and depressed about her discharge \"I am having racing thoughts because I am ready for a change and ready to get my life back. I also just want to know when I am leaving and starting my treatment.\" No other comments/concerns.   "

## 2018-01-03 NOTE — PLAN OF CARE
Problem: Suicidal Behavior (Adult)  Goal: Suicidal Behavior is Absent/Minimized/Managed  Outcome: Improving  No longer expresses suicidal ideation; able to identify support system (if is accepted into substance abuse treatment program).

## 2018-01-03 NOTE — PLAN OF CARE
"Problem: General Plan of Care (Inpatient Behavioral)  Goal: Patient Schedule  Patient's Schedule:   Pt attended 2 of 2 OT groups. Pt participated in an extended OT clinic group designed to facilitate and assess communication, social interaction, and cognitive skills such as attention, concentration, problem solving, and memory. Pt had a loose plan of the goal oriented task she wanted to begin. Pt made her selection, asking questions to inform her decision. Pt was was agreeable and pleasant, initiating interaction with peers and demonstrating divided attention with the ability to remained focused on her task and talk at the same time while soft music was playing. Pt made detailed progress and worked industriously. Pt responded to a question posed by a peer about the availability of ribbon by saying \"because we could choke ourselves\" demonstrating an awareness of safety on the unit.       "

## 2018-01-03 NOTE — PROGRESS NOTES
"[facility], [place]   Psychiatric Progress Note        Interim History:   The patient's care was discussed with the treatment team during the daily team meeting and/or staff's chart notes were reviewed.  Staff report patient wasn't able to do [facility] but has been referred to [place] programs.     The patient reports that she was \"A little let down\" yesterday but feels more hopeful today. Mood is okay. Slept well. Does say that she could maybe go home for a few days before entering CD treatment but is \"worried that I would drink myself to death.\" Denies SI or HI. Denies AH or VH.          Medications:       multivitamin, therapeutic with minerals  1 tablet Oral Daily     traZODone  150 mg Oral At Bedtime     divalproex  500 mg Oral Daily at 8 pm     DULoxetine  30 mg Oral Daily     gabapentin (NEURONTIN) tablet 600 mg  600 mg Oral TID     carvedilol  25 mg Oral BID w/meals     chlorthalidone (HYGROTON) half-tab 12.5 mg  12.5 mg Oral Daily     [START ON 1/20/2018] cyanocobalamin  1,000 mcg Intramuscular Q30 Days     diltiazem  360 mg Oral Daily     ferrous sulfate  325 mg Oral Daily with breakfast     potassium chloride SA (K-DUR/KLOR-CON M) CR tablet 20 mEq  20 mEq Oral Daily     pyridOXINE  25 mg Oral Daily     cholecalciferol  1,000 Units Oral Daily     nicotine   Transdermal Q8H     nicotine   Transdermal Daily     nicotine  1 patch Transdermal Daily          Allergies:   No Known Allergies       Labs:   No results found for this or any previous visit (from the past 24 hour(s)).       Psychiatric Examination:     BP (!) 155/103  Pulse 82  Temp 98.5  F (36.9  C) (Tympanic)  Resp 16  Ht 1.651 m (5' 5\")  Wt 69.2 kg (152 lb 9.6 oz)  SpO2 100%  BMI 25.39 kg/m2  Weight is 152 lbs 9.6 oz  Body mass index is 25.39 kg/(m^2).  Orthostatic Vitals       Most Recent      Sitting Orthostatic /85 12/28 1112    Sitting Orthostatic Pulse (bpm) 78 12/28 1112    Standing Orthostatic BP " "108/79 12/28 1112    Standing Orthostatic Pulse (bpm) 78 12/28 1112            Appearance: awake, alert and adequately groomed  Attitude:  cooperative  Eye Contact:  good  Mood:  \"okay\"  Affect:  mood congruent  Speech:  clear, coherent  Psychomotor Behavior:  no evidence of tardive dyskinesia, dystonia, or tics  Thought Process:  linear  Associations:  no loose associations  Thought Content:  no evidence of suicidal ideation or homicidal ideation and no evidence of psychotic thought  Insight:  fair  Judgement:  fair  Oriented to:  time, person, and place  Attention Span and Concentration:  fair  Recent and Remote Memory:  fair         Precautions:     Behavioral Orders   Procedures     Code 1 - Restrict to Unit     Fall precautions     Routine Programming     As clinically indicated     Status 15     Every 15 minutes.     Suicide precautions          DIagnoses:     Axis I:  Bipolar illness, type 2, depressed.   II:  Posttraumatic stress disorder.   III:  Alcohol dependence and withdrawal (alcohol use disorders, severe with withdrawal).          Plan:     1) Continue VPA 500mg Qhs.   2) Paxil was changed to Cymbalta 30mg Qday.   3) Continue Trazodone 150mg Qhs.   4) Disposition likely to CD treatment. Patient completed Rule 25 paperwork. Patient to show improvement in terms of mood prior to discharge.        "

## 2018-01-03 NOTE — PROGRESS NOTES
CLINICAL NUTRITION SERVICES - REASSESSMENT NOTE     Nutrition Prescription    RECOMMENDATIONS FOR MDs/PROVIDERS TO ORDER:  None today    Malnutrition Status:    Non-severe malnutrition in the context of environmental or social circumstances    Recommendations already ordered by Registered Dietitian (RD):  Per pt request:  -Discontinued Ensure Plus.  -Added snack of cottage cheese and peaches at HS. Per pt will get additional snacks such as string cheese from unit.      Future/Additional Recommendations:  -Monitor po intakes and weight trends       EVALUATION OF THE PROGRESS TOWARD GOALS   Diet: Regular  Ensure Plus with meals and at HS (strawberry, butter pecan or vanilla only)  Intake: Good appetite per chart review.  Per pt she has no longer been drinking the Ensure Plus.  Reports eating well at meals and weight almost back to usual.       NEW FINDINGS   Weight (1/2):  152 lbs 9.6 oz    Admission Weight (12/26):  150 lbs vs. 143 lbs 8 oz.  Per pt UBW:  156 lbs.    MALNUTRITION  % Intake: no decreased intake  % Weight Loss: > 5% in 1 month (severe)  Subcutaneous Fat Loss: Facial region: mild  Muscle Loss: None observed  Fluid Accumulation/Edema: None noted  Malnutrition Diagnosis: Non-severe malnutrition in the context of environmental or social circumstances       Previous Goals   Patient to consume % of nutritionally adequate meal trays TID, or the equivalent with supplements/snacks  Evaluation: Met    Previous Nutrition Diagnosis  Inadequate oral intake related to poor appetite as evidenced by pt likely meeting <50% of nutritional needs over the last 3 days and a 8.6% wt loss over the last ~month  Evaluation: Improving/updated below.      CURRENT NUTRITION DIAGNOSIS  Predicted suboptimal nutrient intakes related to previously decreased appetite and weight loss, but now eating well as evidenced by pt report and chart review.        INTERVENTIONS  Implementation  Nutrition Education:  Encouraged continued  good intakes of tid meals plus supplements.    Discontinued Ensure Plus and added HS snack per pt request.    Goals  Patient to consume % of nutritionally adequate meal trays TID, or the equivalent with supplements/snacks.    Monitoring/Evaluation  Progress toward goals will be monitored and evaluated per protocol.    Millie Pham RD, LD

## 2018-01-04 VITALS
WEIGHT: 155.5 LBS | HEIGHT: 65 IN | DIASTOLIC BLOOD PRESSURE: 92 MMHG | OXYGEN SATURATION: 100 % | BODY MASS INDEX: 25.91 KG/M2 | SYSTOLIC BLOOD PRESSURE: 138 MMHG | RESPIRATION RATE: 16 BRPM | HEART RATE: 92 BPM | TEMPERATURE: 98.6 F

## 2018-01-04 LAB — POTASSIUM SERPL-SCNC: 3.9 MMOL/L (ref 3.4–5.3)

## 2018-01-04 PROCEDURE — 97150 GROUP THERAPEUTIC PROCEDURES: CPT | Mod: GO

## 2018-01-04 PROCEDURE — 25000132 ZZH RX MED GY IP 250 OP 250 PS 637: Mod: GY | Performed by: NURSE PRACTITIONER

## 2018-01-04 PROCEDURE — 36415 COLL VENOUS BLD VENIPUNCTURE: CPT | Performed by: PHYSICIAN ASSISTANT

## 2018-01-04 PROCEDURE — A9270 NON-COVERED ITEM OR SERVICE: HCPCS | Mod: GY | Performed by: NURSE PRACTITIONER

## 2018-01-04 PROCEDURE — 90853 GROUP PSYCHOTHERAPY: CPT

## 2018-01-04 PROCEDURE — 84132 ASSAY OF SERUM POTASSIUM: CPT | Performed by: PHYSICIAN ASSISTANT

## 2018-01-04 PROCEDURE — A9270 NON-COVERED ITEM OR SERVICE: HCPCS | Mod: GY | Performed by: PSYCHIATRY & NEUROLOGY

## 2018-01-04 PROCEDURE — 25000132 ZZH RX MED GY IP 250 OP 250 PS 637: Mod: GY | Performed by: PSYCHIATRY & NEUROLOGY

## 2018-01-04 PROCEDURE — 99207 ZZC NO CHARGE VISIT/PATIENT NOT SEEN: CPT | Performed by: PSYCHIATRY & NEUROLOGY

## 2018-01-04 PROCEDURE — H2032 ACTIVITY THERAPY, PER 15 MIN: HCPCS

## 2018-01-04 RX ORDER — HYDRALAZINE HYDROCHLORIDE 25 MG/1
25-50 TABLET, FILM COATED ORAL 4 TIMES DAILY PRN
Qty: 120 TABLET | COMMUNITY
Start: 2018-01-04

## 2018-01-04 RX ORDER — DIVALPROEX SODIUM 500 MG/1
500 TABLET, DELAYED RELEASE ORAL
Qty: 60 TABLET | COMMUNITY
Start: 2018-01-04

## 2018-01-04 RX ORDER — MULTIPLE VITAMINS W/ MINERALS TAB 9MG-400MCG
1 TAB ORAL DAILY
Qty: 30 EACH | Refills: 0 | COMMUNITY
Start: 2018-01-05

## 2018-01-04 RX ORDER — NICOTINE 21 MG/24HR
1 PATCH, TRANSDERMAL 24 HOURS TRANSDERMAL DAILY
Qty: 30 PATCH | COMMUNITY
Start: 2018-01-05

## 2018-01-04 RX ORDER — DULOXETIN HYDROCHLORIDE 30 MG/1
30 CAPSULE, DELAYED RELEASE ORAL DAILY
Qty: 60 CAPSULE | COMMUNITY
Start: 2018-01-05

## 2018-01-04 RX ADMIN — GABAPENTIN 600 MG: 600 TABLET, FILM COATED ORAL at 13:36

## 2018-01-04 RX ADMIN — MULTIPLE VITAMINS W/ MINERALS TAB 1 TABLET: TAB at 08:18

## 2018-01-04 RX ADMIN — FERROUS SULFATE TAB 325 MG (65 MG ELEMENTAL FE) 325 MG: 325 (65 FE) TAB at 08:18

## 2018-01-04 RX ADMIN — GABAPENTIN 600 MG: 600 TABLET, FILM COATED ORAL at 08:18

## 2018-01-04 RX ADMIN — Medication 25 MG: at 08:18

## 2018-01-04 RX ADMIN — NICOTINE 1 PATCH: 21 PATCH, EXTENDED RELEASE TRANSDERMAL at 08:18

## 2018-01-04 RX ADMIN — POTASSIUM CHLORIDE 20 MEQ: 20 TABLET, EXTENDED RELEASE ORAL at 08:20

## 2018-01-04 RX ADMIN — CARVEDILOL 25 MG: 25 TABLET, FILM COATED ORAL at 08:18

## 2018-01-04 RX ADMIN — DULOXETINE HYDROCHLORIDE 30 MG: 30 CAPSULE, DELAYED RELEASE ORAL at 08:18

## 2018-01-04 RX ADMIN — VITAMIN D, TAB 1000IU (100/BT) 1000 UNITS: 25 TAB at 08:18

## 2018-01-04 RX ADMIN — Medication 12.5 MG: at 08:18

## 2018-01-04 RX ADMIN — DILTIAZEM HYDROCHLORIDE 360 MG: 360 CAPSULE, EXTENDED RELEASE ORAL at 08:18

## 2018-01-04 ASSESSMENT — ACTIVITIES OF DAILY LIVING (ADL)
ORAL_HYGIENE: INDEPENDENT
DRESS: INDEPENDENT
GROOMING: INDEPENDENT

## 2018-01-04 NOTE — PROGRESS NOTES
Brief Medicine Note    Contacted by RN staff regarding hypokalemia (K 3.3) on admission labs from 12/27 without follow up labs obtained. Ordered recheck Potassium level which was WNL at 3.9. Continue PTA KCl 20 mEq daily. No need to further trend potassium level at this time.     Medicine will sign off.    Puja Vanegas PA-C  Hospitalist Service  [phone]

## 2018-01-04 NOTE — PLAN OF CARE
Problem: Patient Care Overview  Goal: Plan of Care/Patient Progress Review  Outcome: Adequate for Discharge Date Met: 01/04/18  Pt has been accepted to inpatient treatment at [facility].  Pt denies SI.  She reports increased hope for future and expresses desire to obtain help with her addiction.    She is med compliant, cooperative, pleasant, and appropriate in milieu.  Attends most groups/participates.  Independent with ADL's.  Appetite and sleep good.    Discharged to [facility] via cab.  All belongings sent with pt.

## 2018-01-04 NOTE — PROGRESS NOTES
Pt will discharge to [facility] in [facility] today at 3pm. Dr. Hope was notified. AVS faxed to Letty at [phone].

## 2018-01-04 NOTE — PROGRESS NOTES
Pt present in milieu throughout the evening, attended and participated in community meeting (warning signs); sad; full range affect said she plans to attend treatment following discharge, and hopes to meet new friends that she can trust; RAMANDEEP SI; no SIB;        01/03/18 2138   Behavioral Health   Hallucinations denies / not responding to hallucinations   Thinking intact   Orientation person: oriented;place: oriented;time: oriented;date: oriented   Memory baseline memory   Insight admits / accepts   Judgement intact   Eye Contact at examiner   Affect sad;full range affect   Mood mood is calm   Physical Appearance/Attire attire appropriate to age and situation   Hygiene well groomed   Suicidality other (see comments)  (RAMANDEEP)   Self Injury other (see comment)  (none)   Elopement (none)   Activity other (see comment)  (in milieu )   Speech clear;coherent   Medication Sensitivity no stated side effects;no observed side effects   Psychomotor / Gait balanced;steady   Psycho Education   Type of Intervention 1:1 intervention   Response participates, initiates socially appropriate   Hours 0.5   Treatment Detail check in; community meeting    Activities of Daily Living   Hygiene/Grooming independent   Oral Hygiene independent   Dress independent   Laundry unable to complete   Room Organization independent   Activity   Activity Assistance Provided independent

## 2018-01-04 NOTE — PROGRESS NOTES
Left voice message for [facility] ([phone]) to determine if they have received Rule 25 and if funding has been approved for pt.

## 2018-01-04 NOTE — PLAN OF CARE
Problem: General Plan of Care (Inpatient Behavioral)  Goal: Patient Schedule  Patient's Schedule:   Pt attended 2 of 2 OT groups. Pt participated in OT Clinic designed to facilitate and assess communication, social interaction, and cognitive skills such as initiation, attention, memory, decision making, problem solving, and planning. Pt demonstrated assertive communication, prosocial interaction, and focus and concentration. Pt contributed to discussion overviewing the purpose and benefits of engaging in creative tasks. Pt participated in a general health and coping group focused on communication styles with role playing activity to emphasize assertive communication. Pt was engaged, offering relevant contributions and asking pertinent questions. Pt shared opinions and demonstrated positive assertion of her thoughts on the subject matter.

## 2018-01-04 NOTE — DISCHARGE INSTRUCTIONS
Behavioral Discharge Planning and Instructions      Summary:  You were admitted on 12/26/2017  due to Depression and Suicidal Ideations.  You were treated by Dr. Haim Montgomery MD and discharged on 01/04/2018 from [facility] to Substance Abuse Treatment Program at [facility].    Mental Health Diagnosis  Bipolar Illness, type 2, depressed    Health Care Follow-up Appointments:   Pt is going to a treatment facility and prefers to schedule her own appointments.  Attend all scheduled appointments with your outpatient providers. Call at least 24 hours in advance if you need to reschedule an appointment to ensure continued access to your outpatient providers.   Major Treatments, Procedures and Findings:  You were provided with: a psychiatric assessment, assessed for medical stability and medication evaluation and/or management    Symptoms to Report: feeling more aggressive, increased confusion, losing more sleep, mood getting worse or thoughts of suicide    Early warning signs can include: increased depression or anxiety sleep disturbances increased thoughts or behaviors of suicide or self-harm  increased unusual thinking, such as paranoia or hearing voices    Safety and Wellness:  Take all medicines as directed.  Make no changes unless your doctor suggests them.      Follow treatment recommendations.  Refrain from alcohol and non-prescribed drugs.  If there is a concern for safety, call 911.    Resources:   Crisis Intervention: 937.608.6871 or 999-117-7146 (TTY: 125.919.9278).  Call anytime for help.  National [place] on Mental Illness (www.[place].org): 640.338.8805 or 182-114-6183.  Alcoholics Anonymous (www.alcoholics-anonymous.org): Check your phone book for your local chapter.  Suicide Awareness Voices of Education (SAVE) (www.save.org): 807-928-XVUM (3215)  National Suicide Prevention Line (www.mentalhealthmn.org): 499-991-ZLMZ (4852)  Mental Health Consumer/Survivor Network of [place] (www.mhcsn.net): 973.159.6764 or  774-231-1408  Mental Health Association of [place] (www.mentalhealth.org): 788.945.9992 or 225-928-5458  Self- Management and Recovery Training., SMART-- Toll free: 762.425.9635  www.[employer]  [facility] Crisis (COPE) Response - Adult [phone]    The treatment team has appreciated the opportunity to work with you.     If you have any questions or concerns our unit number is [phone].

## 2018-01-04 NOTE — DISCHARGE SUMMARY
"Psychiatric Discharge Summary    Taty Kearney MRN# [id number]   Age: 62 year old YOB: 1955     Date of Admission:  12/26/2017  Date of Discharge:  1/4/2018  3:45 PM  Admitting Physician:  Abimael Billingsley MD  Discharge Physician:  Haim Hpoe MD          Event Leading to Hospitalization:   Ms. Taty Kearney, date of birth 1955, is a 62-year-old woman admitted to the [facility] Psychiatry unit on 12/26/2017.  She was admitted to the hospital to treat depression with plans to overdose on trazodone in the context of alcohol dependency.  She has had increased depression for the past 1-2 weeks, correlating with the holiday season.  She notes her mood has been \"low, low, low.\"       She has been taking Paxil for over 10 years, along with trazodone with limited results.       Psychiatric history is noted in addition to the depression for alcohol use.  She estimates she has had 4-5 previous chemical dependency treatments, and she has currently been drinking for about 4 months, having done treatment in the spring followed by chemical dependency and mental Health Dual Diagnosis outpatient treatment at [facility].  She is currently drinking a 6-pack plus \"2 shooters of yamile per day.\"  Alcohol level was 0.110.  She has taken Campral and naltrexone, one of these affected her kidneys and the other was simply not effective.       She would like to go to treatment again.       Depression onset was a few years ago.  She has been on Paxil for many years, over 10, and again notes it has not been terribly helpful.  She does wonder if she has bipolar illness because of mood swings and mood lability.  She also has spells where people tell she talks too much or talks too fast.        See Admission note by Haim Hope MD for additional details.          DIagnoses:     Axis I:  Bipolar illness, type 2, depressed.   II:  Posttraumatic stress disorder.   III: "  Alcohol dependence and withdrawal (alcohol use disorders, severe with withdrawal).          Labs:          Lab Results   Component Value Date     12/27/2017    Lab Results   Component Value Date    CHLORIDE 102 12/27/2017    Lab Results   Component Value Date    BUN 11 12/27/2017      Lab Results   Component Value Date    POTASSIUM 3.9 01/04/2018    Lab Results   Component Value Date    CO2 31 12/27/2017    Lab Results   Component Value Date    CR 0.96 12/27/2017        Lab Results   Component Value Date    WBC 6.3 12/27/2017    HGB 15.9 (H) 12/27/2017    HCT 45.8 12/27/2017    MCV 93 12/27/2017     12/27/2017     Lab Results   Component Value Date    AST 97 (H) 12/27/2017    ALT 80 (H) 12/27/2017     (H) 12/27/2017    ALKPHOS 98 12/27/2017    BILITOTAL 1.2 12/27/2017    BILICONJ 0.0 08/24/2009     Lab Results   Component Value Date    TSH 0.59 12/27/2017              Consults:   Consultation during this admission received from internal medicine:    Taty Kearney is a 62 year old female with a history of hepatitis C s/p treatment, recurrrent gential HSV, pancreatitis, HTN, ETOH abuse, depression, COPD, bilateral OA of the knees s/p TKA, tobacco use, bipolar 2 d/o, PTSD, h/o GIB who was admitted and being followed by psychiatry for depression and etoh detox. Please refer to psychiatry notes for further details. IM was asked to see this patient for HTN management in the context of withdrawal.      1. Mental health decompensation  Management per primary psychiatry team     2. ETOH abuse with detox. ETOH level on admission 0.11. No h/o seizures. + hallucinations.   - Recommend MVI, folic acid and thiamine supplementation  - Agree with MSSA with valium  - Management per primary psychiatry team     3. HTN, uncontrolled. Elevation likely r/t acute withdrawal. Absent coronary symptoms. No h/o CVA. No HA. PTA on diltiazem XR 360mg daily, chlorthalidone 12.5mg daily, coreg 25mg BID. + smoker. No DM.  Last  (2013). No family history of heart disease.   - Continue PTA meds.   - Hydralazine prn ordered.   - IM will follow BP trend with you.      4. COPD. PTA on albuterol prn. Last use 1 week ago. No controller therapies. Sx stable. On RA.   - Continue albuterol prn.      5. Recurrent genital HSV. No active flare. Not on suppressive therapy.      6. H/o hep C, acute transaminitis. S/p Harvoni 2014. Last RNA quant negative 2015. LFT function previously WNL. This admission, ALT 80, AST 97,  most reflective of ETOH induced elevation. Clinically asymptomatic.   - CMP in 24 hours for stability.      7. H/o pancreatitis. Verbalized to be 2/2 ETOH, advil. Last flare 1 year ago. No abdominal or back pain today.      8. Neuropathy. Likely 2/2 ETOH. No h/o DM. PTA on neurontin 600mg TID.   - Continue.      9. H/o vitamin B12 and iron deficiency. PTA on vitamin B12 q 30 days, iron daily.   - Continue home therapies. Last Vitamin B12 dose 12/22/17.      10. Hypokalemia. PTA on potassium 20meq daily. Likely r/t ETOH abuse and diuretic use. 3.3 today.   - Continue home potassium. Received dose this am. K tomorrow.      11. Acute polycythemia. Likely r/t smoking and/or COPD history or recent ETOH binge.   - Push po fluids. No IP w/u indicated.          Hospital Course:   Taty Kearney was admitted to Station 3B with attending Abimael Billingsley MD and transferred HerminiaHaim MD as a voluntary patient. The patient was placed under status 15 (15 minute checks) to ensure patient safety.   MSSA protocol was initiated due to the patient's history of alcohol abuse and concern for withdrawal symptoms.  CBC, BMP and utox obtained.    All outpatient medications were continued with the exception of Paxil and Trazodone. Minipress was tried and was not effective so Trazodone was restarted. Depakote was started at 500mg Qhs and Cymbalta was started to good effect.     Taty Kearney did participate in groups and was visible  in the milieu.     The patient's symptoms of depression and withdrawal improved.     Taty Kearney was released to  treatment. At the time of discharge Taty Kearney was determined to not be a danger to herself or others. At the current time of discharge, the patient does not meet criteria for involuntary hospitalization. On the day of discharge, the patient reports that they do not have suicidal or homicidal ideation and would never hurt themselves or others. Steps taken to minimize risk include: assessing patient s behavior and thought process daily during hospital stay, discharging patient with adequate plan for follow up for mental and physical health and discussing safety plan of returning to the hospital should the patient ever have thoughts of harming themselves or others. Therefore, based on all available evidence including the factors cited above, the patient does not appear to be at imminent risk for self-harm, and is appropriate for outpatient level of care.            Discharge Medications:     Current Discharge Medication List      START taking these medications    Details   DULoxetine (CYMBALTA) 30 MG EC capsule Take 1 capsule (30 mg) by mouth daily  Qty: 60 capsule      hydrALAZINE (APRESOLINE) 25 MG tablet Take 1-2 tablets (25-50 mg) by mouth 4 times daily as needed (sbp>160)  Qty: 120 tablet      multivitamin, therapeutic with minerals (THERA-VIT-M) TABS tablet Take 1 tablet by mouth daily  Qty: 30 each, Refills: 0      divalproex (DEPAKOTE) 500 MG EC tablet Take 1 tablet (500 mg) by mouth  Qty: 60 tablet      nicotine (NICODERM CQ) 21 MG/24HR 24 hr patch Place 1 patch onto the skin daily  Qty: 30 patch         CONTINUE these medications which have NOT CHANGED    Details   VITAMIN D, CHOLECALCIFEROL, PO Take 1,000 Units by mouth daily      tiotropium (SPIRIVA) 18 MCG capsule Inhale 18 mcg into the lungs daily as needed      Potassium Chloride Ana ER (K-DUR PO) Take 20 mEq by mouth daily       GABAPENTIN PO Take 600 mg by mouth 3 times daily as needed (chronic back pain)       TRAZODONE HCL PO Take 150 mg by mouth At Bedtime      CHLORTHALIDONE PO Take 12.5 mg by mouth daily      Pyridoxine HCl (VITAMIN B6 PO) Take 25 mg by mouth daily       carvedilol (COREG) 25 MG tablet Take 25 mg by mouth 2 times daily (with meals)      ferrous sulfate 325 (65 FE) MG tablet Take 325 mg by mouth daily (with breakfast)       cyanocobalamin 1000 MCG/ML injection Inject 1 mL into the muscle every 30 days      diltiazem (TIAZAC) 360 MG 24 hr SA capsule Take 1 capsule by mouth daily.  Qty: 30 capsule, Refills: 3    Associated Diagnoses: Hypertension      albuterol (PROAIR HFA) 108 (90 BASE) MCG/ACT inhaler Inhale 2 puffs into the lungs every 6 hours as needed for shortness of breath / dyspnea.  Qty: 1 Inhaler, Refills: 5    Associated Diagnoses: Tobacco abuse; COPD (chronic obstructive pulmonary disease) (H)         STOP taking these medications       PARoxetine (PAXIL-CR) 25 MG 24 hr tablet Comments:   Reason for Stopping:                    Psychiatric Examination:     The patient was not seen on the day of discharge.          Discharge Plan:   Continue VPA, Cymbalta and Trazodone.     Health Care Follow-up Appointments:   Pt is going to a treatment facility and prefers to schedule her own appointments.  Attend all scheduled appointments with your outpatient providers. Call at least 24 hours in advance if you need to reschedule an appointment to ensure continued access to your outpatient providers.   Major Treatments, Procedures and Findings:  You were provided with: a psychiatric assessment, assessed for medical stability and medication evaluation and/or management     Symptoms to Report: feeling more aggressive, increased confusion, losing more sleep, mood getting worse or thoughts of suicide     Early warning signs can include: increased depression or anxiety sleep disturbances increased thoughts or behaviors of  suicide or self-harm  increased unusual thinking, such as paranoia or hearing voices     Safety and Wellness:  Take all medicines as directed.  Make no changes unless your doctor suggests them.      Follow treatment recommendations.  Refrain from alcohol and non-prescribed drugs.  If there is a concern for safety, call 911.     Resources:   Crisis Intervention: 103.149.8605 or 910-474-2906 (TTY: 687.972.1002).  Call anytime for help.  National [place] on Mental Illness (www.[place].org): 707.469.1408 or 762-020-9470.  Alcoholics Anonymous (www.alcoholics-anonymous.org): Check your phone book for your local chapter.  Suicide Awareness Voices of Education (SAVE) (www.save.org): 596-886-WTTK (4342)  National Suicide Prevention Line (www.mentalhealthmn.org): 302-189-ULAO (3233)  Mental Health Consumer/Survivor Network of [place] (www.mhcsn.net): 323.849.4899 or 240-435-5808  Mental Health Association of [place] (www.mentalhealth.org): 184.698.2293 or 364-091-5367  Self- Management and Recovery Training., SMART-- Toll free: 424.143.5961  www.[place]ingo.org  [facility] Crisis (COPE) Response - Adult [phone]    Attestation:  I spent less than 30 minutes on discharge day activities. Haim Hope MD

## 2018-02-20 ENCOUNTER — COMMUNICATION - HEALTHEAST (OUTPATIENT)
Dept: BEHAVIORAL HEALTH | Facility: CLINIC | Age: 63
End: 2018-02-20

## 2018-09-16 ENCOUNTER — OFFICE VISIT (OUTPATIENT)
Dept: URGENT CARE | Facility: URGENT CARE | Age: 63
End: 2018-09-16
Payer: MEDICARE

## 2018-09-16 VITALS
OXYGEN SATURATION: 98 % | BODY MASS INDEX: 25.29 KG/M2 | WEIGHT: 152 LBS | SYSTOLIC BLOOD PRESSURE: 142 MMHG | TEMPERATURE: 98 F | RESPIRATION RATE: 18 BRPM | HEART RATE: 87 BPM | DIASTOLIC BLOOD PRESSURE: 96 MMHG

## 2018-09-16 DIAGNOSIS — S02.5XXA CLOSED FRACTURE OF TOOTH, INITIAL ENCOUNTER: Primary | ICD-10-CM

## 2018-09-16 PROCEDURE — 99213 OFFICE O/P EST LOW 20 MIN: CPT | Performed by: NURSE PRACTITIONER

## 2018-09-16 RX ORDER — PENICILLIN V POTASSIUM 500 MG/1
500 TABLET, FILM COATED ORAL 2 TIMES DAILY
Qty: 20 TABLET | Refills: 0 | Status: SHIPPED | OUTPATIENT
Start: 2018-09-16 | End: 2023-09-14

## 2018-09-16 RX ORDER — HYDROCODONE BITARTRATE AND ACETAMINOPHEN 5; 325 MG/1; MG/1
1 TABLET ORAL EVERY 6 HOURS PRN
Qty: 10 TABLET | Refills: 0 | Status: SHIPPED | OUTPATIENT
Start: 2018-09-16

## 2018-09-16 ASSESSMENT — PAIN SCALES - GENERAL: PAINLEVEL: WORST PAIN (10)

## 2018-09-16 NOTE — MR AVS SNAPSHOT
After Visit Summary   9/16/2018    Taty Kearney    MRN: [id number]           Patient Information     Date Of Birth          1955        Visit Information        Provider Department      9/16/2018 9:05 AM Ayana Steel APRN CNP [facility]        Today's Diagnoses     Closed fracture of tooth, initial encounter    -  1       Follow-ups after your visit        Who to contact     If you have questions or need follow up information about today's clinic visit or your schedule please contact [facility] directly at [phone].  Normal or non-critical lab and imaging results will be communicated to you by MyChart, letter or phone within 4 business days after the clinic has received the results. If you do not hear from us within 7 days, please contact the clinic through MyChart or phone. If you have a critical or abnormal lab result, we will notify you by phone as soon as possible.  Submit refill requests through [employer] or call your pharmacy and they will forward the refill request to us. Please allow 3 business days for your refill to be completed.          Additional Information About Your Visit        Care EveryWhere ID     This is your Care EveryWhere ID. This could be used by other organizations to access your [facility] medical records  [id number]        Your Vitals Were     Pulse Temperature Respirations Pulse Oximetry BMI (Body Mass Index)       87 98  F (36.7  C) (Oral) 18 98% 25.29 kg/m2        Blood Pressure from Last 3 Encounters:   09/16/18 (!) 142/96   01/04/18 (!) 138/92   10/15/15 118/81    Weight from Last 3 Encounters:   09/16/18 152 lb (68.9 kg)   01/04/18 155 lb 8 oz (70.5 kg)   10/15/15 169 lb 6.4 oz (76.8 kg)              Today, you had the following     No orders found for display         Today's Medication Changes          These changes are accurate as of 9/16/18  9:26 AM.  If you have any questions, ask your nurse or doctor.                Start taking these medicines.        Dose/Directions    HYDROcodone-acetaminophen 5-325 MG per tablet   Commonly known as:  NORCO   Used for:  Closed fracture of tooth, initial encounter   Started by:  Ayana Steel APRN CNP        Dose:  1 tablet   Take 1 tablet by mouth every 6 hours as needed for pain   Quantity:  10 tablet   Refills:  0       penicillin V potassium 500 MG tablet   Commonly known as:  VEETID   Used for:  Closed fracture of tooth, initial encounter   Started by:  Ayana Steel APRN CNP        Dose:  500 mg   Take 1 tablet (500 mg) by mouth 2 times daily   Quantity:  20 tablet   Refills:  0            Where to get your medicines      These [place] [facility] [place] - [address]  [address], [facility] [address]     Phone:  [phone]     penicillin V potassium 500 MG tablet         Some of these will need a paper prescription and others can be bought over the counter.  Ask your nurse if you have questions.     Bring a paper prescription for each of these medications     HYDROcodone-acetaminophen 5-325 MG per tablet               Information about OPIOIDS     PRESCRIPTION OPIOIDS: WHAT YOU NEED TO KNOW   We gave you an opioid (narcotic) pain medicine. It is important to manage your pain, but opioids are not always the best choice. You should first try all the other options your care team gave you. Take this medicine for as short a time (and as few doses) as possible.    Some activities can increase your pain, such as bandage changes or therapy sessions. It may help to take your pain medicine 30 to 60 minutes before these activities. Reduce your stress by getting enough sleep, working on hobbies you enjoy and practicing relaxation or meditation. Talk to your care team about ways to manage your pain beyond prescription opioids.    These medicines have risks:    DO NOT drive when on new or higher doses of pain  medicine. These medicines can affect your alertness and reaction times, and you could be arrested for driving under the influence (DUI). If you need to use opioids long-term, talk to your care team about driving.    DO NOT operate heavy machinery    DO NOT do any other dangerous activities while taking these medicines.    DO NOT drink any alcohol while taking these medicines.     If the opioid prescribed includes acetaminophen, DO NOT take with any other medicines that contain acetaminophen. Read all labels carefully. Look for the word  acetaminophen  or  Tylenol.  Ask your pharmacist if you have questions or are unsure.    You can get addicted to pain medicines, especially if you have a history of addiction (chemical, alcohol or substance dependence). Talk to your care team about ways to reduce this risk.    All opioids tend to cause constipation. Drink plenty of water and eat foods that have a lot of fiber, such as fruits, vegetables, prune juice, apple juice and high-fiber cereal. Take a laxative (Miralax, milk of magnesia, Colace, Senna) if you don t move your bowels at least every other day. Other side effects include upset stomach, sleepiness, dizziness, throwing up, tolerance (needing more of the medicine to have the same effect), physical dependence and slowed breathing.    Store your pills in a secure place, locked if possible. We will not replace any lost or stolen medicine. If you don t finish your medicine, please throw away (dispose) as directed by your pharmacist. The [place] Pollution Control Agency has more information about safe disposal: https://www.pca.[facility].[place].us/living-green/managing-unwanted-medications         Primary Care Provider Fax #    [facility] [phone] [address]  [facility] [address]        Equal Access to Services     NICK COURTNEY AH: marito Pepe qaybta kaalmada adeegyada, waxay idiin hayaan  gonzales zapatajenniferosvaldo العليClaudineaatia ah. So [facility] [phone].    ATENCIÓN: Si jorgela estefanía, tiene a noyola disposición servicios gratuitos de asistencia lingüística. Ashanti case [phone].    We comply with applicable federal civil rights laws and [place] laws. We do not discriminate on the basis of race, color, national origin, age, disability, sex, sexual orientation, or gender identity.            Thank you!     Thank you for choosing [facility]  for your care. Our goal is always to provide you with excellent care. Hearing back from our patients is one way we can continue to improve our services. Please take a few minutes to complete the written survey that you may receive in the mail after your visit with us. Thank you!             Your Updated Medication List - Protect others around you: Learn how to safely use, store and throw away your medicines at www.disposemymeds.org.          This list is accurate as of 9/16/18  9:26 AM.  Always use your most recent med list.                   Brand Name Dispense Instructions for use Diagnosis    albuterol 108 (90 Base) MCG/ACT inhaler    PROAIR HFA    1 Inhaler    Inhale 2 puffs into the lungs every 6 hours as needed for shortness of breath / dyspnea.    Tobacco abuse, COPD (chronic obstructive pulmonary disease) (H)       carvedilol 25 MG tablet    COREG     Take 25 mg by mouth 2 times daily (with meals)        CHLORTHALIDONE PO      Take 12.5 mg by mouth daily        cyanocobalamin 1000 MCG/ML injection    VITAMIN B12     Inject 1 mL into the muscle every 30 days        diltiazem 360 MG 24 hr capsule    TIAZAC    30 capsule    Take 1 capsule by mouth daily.    Hypertension       divalproex sodium delayed-release 500 MG DR tablet    DEPAKOTE    60 tablet    Take 1 tablet (500 mg) by mouth        DULoxetine 30 MG EC capsule    CYMBALTA    60 capsule    Take 1 capsule (30 mg) by mouth daily        ferrous sulfate 325 (65 Fe) MG tablet    IRON     Take 325 mg by mouth  daily (with breakfast)        GABAPENTIN PO      Take 600 mg by mouth 3 times daily as needed (chronic back pain)        hydrALAZINE 25 MG tablet    APRESOLINE    120 tablet    Take 1-2 tablets (25-50 mg) by mouth 4 times daily as needed (sbp>160)        HYDROcodone-acetaminophen 5-325 MG per tablet    NORCO    10 tablet    Take 1 tablet by mouth every 6 hours as needed for pain    Closed fracture of tooth, initial encounter       K-DUR PO      Take 20 mEq by mouth daily        multivitamin, therapeutic with minerals Tabs tablet     30 each    Take 1 tablet by mouth daily        nicotine 21 MG/24HR 24 hr patch    NICODERM CQ    30 patch    Place 1 patch onto the skin daily        penicillin V potassium 500 MG tablet    VEETID    20 tablet    Take 1 tablet (500 mg) by mouth 2 times daily    Closed fracture of tooth, initial encounter       tiotropium 18 MCG capsule    SPIRIVA     Inhale 18 mcg into the lungs daily as needed        TRAZODONE HCL PO      Take 150 mg by mouth At Bedtime        VITAMIN B6 PO      Take 25 mg by mouth daily        VITAMIN D (CHOLECALCIFEROL) PO      Take 1,000 Units by mouth daily

## 2018-09-16 NOTE — PROGRESS NOTES
SUBJECTIVE:   Taty Kearney is a 63 year old female presenting with a chief complaint of lower left tooth pain.  Onset of symptoms was 2 day(s) ago.  Course of illness is worsening.    Severity moderate  Current and Associated symptoms: pain, throbbing-cracked tooth  Treatment measures tried include Tylenol/Ibuprofen.  Predisposing factors include None.  Pain is severe, 10/10    Past Medical History:   Diagnosis Date     Bipolar 2 disorder (H)      COPD (chronic obstructive pulmonary disease) (H)      Depression      Ectopic pregnancy      ETOH abuse 2005 and 1999    treatment for both ETOH and crack     History of tubal ligation      HTN      Menopause      Neuroendocrine tumor 2013    s/p gastrectomy     Neuropathy      Osteoarthritis of both knees      Pancreatitis, acute     recurrent 7/2008     PTSD (post-traumatic stress disorder)      Recurrent HSV (herpes simplex virus)     genital     Tobacco abuse      Unspecified drug dependence, unspecified      Unspecified viral hepatitis C without hepatic coma     treated [facility] toy 2014     Vitamin B12 deficiency      Current Outpatient Prescriptions   Medication Sig Dispense Refill     albuterol (PROAIR HFA) 108 (90 BASE) MCG/ACT inhaler Inhale 2 puffs into the lungs every 6 hours as needed for shortness of breath / dyspnea. 1 Inhaler 5     carvedilol (COREG) 25 MG tablet Take 25 mg by mouth 2 times daily (with meals)       CHLORTHALIDONE PO Take 12.5 mg by mouth daily       cyanocobalamin 1000 MCG/ML injection Inject 1 mL into the muscle every 30 days       diltiazem (TIAZAC) 360 MG 24 hr SA capsule Take 1 capsule by mouth daily. 30 capsule 3     divalproex (DEPAKOTE) 500 MG EC tablet Take 1 tablet (500 mg) by mouth 60 tablet      DULoxetine (CYMBALTA) 30 MG EC capsule Take 1 capsule (30 mg) by mouth daily 60 capsule      ferrous sulfate 325 (65 FE) MG tablet Take 325 mg by mouth daily (with breakfast)        GABAPENTIN PO Take 600 mg by mouth 3 times daily as  needed (chronic back pain)        hydrALAZINE (APRESOLINE) 25 MG tablet Take 1-2 tablets (25-50 mg) by mouth 4 times daily as needed (sbp>160) 120 tablet      HYDROcodone-acetaminophen (NORCO) 5-325 MG per tablet Take 1 tablet by mouth every 6 hours as needed for pain 10 tablet 0     multivitamin, therapeutic with minerals (THERA-VIT-M) TABS tablet Take 1 tablet by mouth daily 30 each 0     nicotine (NICODERM CQ) 21 MG/24HR 24 hr patch Place 1 patch onto the skin daily 30 patch      penicillin V potassium (VEETID) 500 MG tablet Take 1 tablet (500 mg) by mouth 2 times daily 20 tablet 0     Potassium Chloride Ana ER (K-DUR PO) Take 20 mEq by mouth daily       Pyridoxine HCl (VITAMIN B6 PO) Take 25 mg by mouth daily        tiotropium (SPIRIVA) 18 MCG capsule Inhale 18 mcg into the lungs daily as needed       TRAZODONE HCL PO Take 150 mg by mouth At Bedtime       VITAMIN D, CHOLECALCIFEROL, PO Take 1,000 Units by mouth daily       Social History   Substance Use Topics     Smoking status: Current Every Day Smoker     Packs/day: 1.00     Types: Cigarettes     Smokeless tobacco: Never Used     Alcohol use No      Comment: quit x 2.5 months ago gone back to AA       ROS:  Review of systems negative except as stated above.    OBJECTIVE:  BP (!) 142/96 (BP Location: Left arm, Patient Position: Chair, Cuff Size: Adult Regular)  Pulse 87  Temp 98  F (36.7  C) (Oral)  Resp 18  Wt 152 lb (68.9 kg)  SpO2 98%  BMI 25.29 kg/m2  GENERAL APPEARANCE: healthy, alert and no distress  EYES: EOMI,  PERRL, conjunctiva clear  HENT: TM's normal bilaterally and oral mucous membranes moist, no erythema noted, lower left broken tooth, erythema and swelling gumline into cheek  NECK: supple, nontender, no lymphadenopathy  RESP: lungs clear to auscultation - no rales, rhonchi or wheezes  CV: regular rates and rhythm, normal S1 S2, no murmur noted    ASSESSMENT:  (S02.5XXA) Closed fracture of tooth, initial encounter  (primary encounter  diagnosis)    Plan: penicillin V potassium (VEETID) 500 MG tablet BID X 10 days         HYDROcodone-acetaminophen (NORCO) 5-325 MG per Tablet (qty #10 given)because severe pain and ibuprofen not helping, will not refill in urgent care. Advised can make drowsy, do not drive on medication. Reports no suicidal thoughts or thoughts of self harm  [place] soft foods, caution hot or cold food  Follow up with dds tomorrow    THAD Moya CNP

## 2021-08-06 ENCOUNTER — OFFICE VISIT (OUTPATIENT)
Dept: URGENT CARE | Facility: URGENT CARE | Age: 66
End: 2021-08-06
Payer: MEDICARE

## 2021-08-06 VITALS
OXYGEN SATURATION: 97 % | DIASTOLIC BLOOD PRESSURE: 82 MMHG | SYSTOLIC BLOOD PRESSURE: 125 MMHG | TEMPERATURE: 98.3 F | HEART RATE: 64 BPM | RESPIRATION RATE: 16 BRPM

## 2021-08-06 DIAGNOSIS — N30.01 ACUTE CYSTITIS WITH HEMATURIA: Primary | ICD-10-CM

## 2021-08-06 DIAGNOSIS — N18.31 STAGE 3A CHRONIC KIDNEY DISEASE (H): ICD-10-CM

## 2021-08-06 DIAGNOSIS — R30.9 URINARY PAIN: ICD-10-CM

## 2021-08-06 LAB
ALBUMIN UR-MCNC: 30 MG/DL
APPEARANCE UR: ABNORMAL
BACTERIA #/AREA URNS HPF: ABNORMAL /HPF
BILIRUB UR QL STRIP: NEGATIVE
COLOR UR AUTO: ABNORMAL
GLUCOSE UR STRIP-MCNC: NEGATIVE MG/DL
HGB UR QL STRIP: ABNORMAL
KETONES UR STRIP-MCNC: NEGATIVE MG/DL
LEUKOCYTE ESTERASE UR QL STRIP: ABNORMAL
NITRATE UR QL: POSITIVE
PH UR STRIP: 5.5 [PH] (ref 5–7)
RBC #/AREA URNS AUTO: >100 /HPF
SP GR UR STRIP: 1.02 (ref 1–1.03)
SQUAMOUS #/AREA URNS AUTO: ABNORMAL /LPF
UROBILINOGEN UR STRIP-ACNC: 0.2 E.U./DL
WBC #/AREA URNS AUTO: >100 /HPF
WBC CLUMPS #/AREA URNS HPF: PRESENT /HPF

## 2021-08-06 PROCEDURE — 87186 SC STD MICRODIL/AGAR DIL: CPT | Performed by: PHYSICIAN ASSISTANT

## 2021-08-06 PROCEDURE — 99213 OFFICE O/P EST LOW 20 MIN: CPT | Performed by: PHYSICIAN ASSISTANT

## 2021-08-06 PROCEDURE — 81001 URINALYSIS AUTO W/SCOPE: CPT | Performed by: PHYSICIAN ASSISTANT

## 2021-08-06 PROCEDURE — 87086 URINE CULTURE/COLONY COUNT: CPT | Performed by: PHYSICIAN ASSISTANT

## 2021-08-06 RX ORDER — TOPIRAMATE 100 MG/1
TABLET, FILM COATED ORAL
COMMUNITY
Start: 2021-06-16

## 2021-08-06 RX ORDER — ARIPIPRAZOLE 2 MG/1
2 TABLET ORAL
COMMUNITY
Start: 2021-06-10

## 2021-08-06 RX ORDER — AMLODIPINE BESYLATE 5 MG/1
TABLET ORAL
COMMUNITY
Start: 2021-01-28

## 2021-08-06 RX ORDER — ATORVASTATIN CALCIUM 40 MG/1
TABLET, FILM COATED ORAL
COMMUNITY
Start: 2021-07-06

## 2021-08-06 RX ORDER — CEFDINIR 300 MG/1
300 CAPSULE ORAL 2 TIMES DAILY
Qty: 20 CAPSULE | Refills: 0 | Status: SHIPPED | OUTPATIENT
Start: 2021-08-06

## 2021-08-06 RX ORDER — PHENAZOPYRIDINE HYDROCHLORIDE 100 MG/1
100 TABLET, FILM COATED ORAL 3 TIMES DAILY PRN
Qty: 6 TABLET | Refills: 0 | Status: SHIPPED | OUTPATIENT
Start: 2021-08-06 | End: 2021-08-08

## 2021-08-06 ASSESSMENT — PAIN SCALES - GENERAL: PAINLEVEL: MODERATE PAIN (5)

## 2021-08-06 NOTE — PATIENT INSTRUCTIONS

## 2021-08-06 NOTE — PROGRESS NOTES
Chief Complaint   Patient presents with     Urinary Pain     Patient started with lower abdominal pain 3 days ago- today started pain with urination and noticed blood in her urine- patient took some tylenol - pain is 5/10 currently. Patient has been having to go to the bathroom right after drinking.  In the past patient has had gallstones.         Medical Decision Making:    Differential Diagnosis:  UTI: UTI, Pyelonephritis, Kidney Stone and STD    Results for orders placed or performed in visit on 08/06/21   UA Macro with Reflex to Micro and Culture - lab collect     Status: Abnormal    Specimen: Urine, Midstream   Result Value Ref Range    Color Urine Brown (A) Colorless, Straw, Light Yellow, Yellow    Appearance Urine Cloudy (A) Clear    Glucose Urine Negative Negative mg/dL    Bilirubin Urine Negative Negative    Ketones Urine Negative Negative mg/dL    Specific Gravity Urine 1.025 1.003 - 1.035    Blood Urine Large (A) Negative    pH Urine 5.5 5.0 - 7.0    Protein Albumin Urine 30  (A) Negative mg/dL    Urobilinogen Urine 0.2 0.2, 1.0 E.U./dL    Nitrite Urine Positive (A) Negative    Leukocyte Esterase Urine Moderate (A) Negative   Urine Microscopic Exam     Status: Abnormal   Result Value Ref Range    Bacteria Urine Moderate (A) None Seen /HPF    RBC Urine >100 (A) 0-2 /HPF /HPF    WBC Urine >100 (A) 0-5 /HPF /HPF    Squamous Epithelials Urine Few (A) None Seen /LPF    WBC Clumps Urine Present (A) None Seen /HPF         ASSESSMENT:      ICD-10-CM    1. Acute cystitis with hematuria  N30.01 cefdinir (OMNICEF) 300 MG capsule     phenazopyridine (PYRIDIUM) 100 MG tablet   2. Stage 3a chronic kidney disease  N18.31    3. Urinary pain  R30.9 UA Macro with Reflex to Micro and Culture - lab collect     UA Macro with Reflex to Micro and Culture - lab collect     Urine Microscopic Exam     Urine Culture           PLAN: Urine culture is pending.  Cefdinir antibiotic.  Pyridium for pain.  As per ordered above.  Drink  plenty of fluids.  Prevention and treatment of UTI's discussed. Follow up with primary care physician if not improving.  Advised about symptoms which might herald more serious problems.    Patient Instructions     Patient Education     Bladder Infection, Female (Adult)     Urine normally doesn't have any germs (bacteria) in it. But bacteria can get into the urinary tract from the skin around the rectum. Or they can travel in the blood from other parts of the body. Once they are in your urinary tract, they can cause infection in these areas:    The urethra (urethritis)    The bladder (cystitis)    The kidneys (pyelonephritis)  The most common place for an infection is in the bladder. This is called a bladder infection. This is one of the most common infections in women. Most bladder infections are easily treated. They are not serious unless the infection spreads to the kidney.  The terms bladder infection, UTI, and cystitis are often used to describe the same thing. But they are not always the same. Cystitis is an inflammation of the bladder. The most common cause of cystitis is an infection.  Symptoms  The infection causes inflammation in the urethra and bladder. This causes many of the symptoms. The most common symptoms of a bladder infection are:    Pain or burning when urinating    Having to urinate more often than normal    Urgent need to urinate    Only a small amount of urine comes out    Blood in urine    Belly (abdominal) discomfort. This is often in the lower belly above the pubic bone.    Cloudy urine    Strong- or bad-smelling urine    Unable to urinate (urinary retention)    Unable to hold urine in (urinary incontinence)    Fever    Loss of appetite    Confusion (in older adults)  Causes  Bladder infections are not contagious. You can't get one from someone else, from a toilet seat, or from sharing a bath.  The most common cause of bladder infections is bacteria from the bowels. The bacteria get onto the  skin around the opening of the urethra. From there, they can get into the urine. Then they travel up to the bladder, causing inflammation and infection. This often happens because of:    Wiping incorrectly after urinating. Always wipe from front to back.    Bowel incontinence    Pregnancy    Procedures such as having a catheter put in    Older age    Not emptying your bladder. This can give bacteria a chance to grow in your urine.    Fluid loss (dehydration)    Constipation    Having sex    Using a diaphragm for birth control   Treatment  Bladder infections are diagnosed by a urine test and urine culture. They are treated with antibiotics. They often clear up quickly without problems. Treatment helps prevent a more serious kidney infection.  Medicines  Medicines can help in the treatment of a bladder infection:    Take antibiotics until they are used up, even if you feel better. It's important to finish them to make sure the infection has cleared.    You can use acetaminophen or ibuprofen for pain, fever, or discomfort, unless another medicine was prescribed. If you have long-term (chronic) liver or kidney disease, talk with your healthcare provider before using these medicines. Also talk with your provider if you've ever had a stomach ulcer or GI (gastrointestinal) bleeding, or are taking blood-thinner medicines.    If you are given phenazopydridine to reduce burning with urination, it will make your urine a bright orange color. This can stain clothing.  Care and prevention  These self-care steps can help prevent future infections:    Drink plenty of fluids. This helps to prevent dehydration and flush out your bladder. Do this unless you must restrict fluids for other health reasons, or your healthcare provider told you not to.    Clean yourself correctly after going to the bathroom. Wipe from front to back after using the toilet. This helps prevent the spread of bacteria.    Urinate more often. Don't try to hold  urine in for a long time.    Wear loose-fitting clothes and cotton underwear. Don't wear tight-fitting pants.    Improve your diet and prevent constipation. Eat more fresh fruits and vegetables, and fiber. Eat less junk foods and fatty foods.    Don't have sex until your symptoms are gone.    Don't have caffeine, alcohol, and spicy foods. These can irritate your bladder.    Urinate right after you have sex to flush out your bladder.    If you use birth control pills and have frequent bladder infections, discuss it with your healthcare provider.  Follow-up care  Call your healthcare provider if all symptoms are not gone after 3 days of treatment. This is especially important if you have repeat infections.  If a culture was done, you will be told if your treatment needs to be changed. If directed, you can call to find out the results.  If X-rays were done, you will be told if the results will affect your treatment.  Call 911  Call 911 if any of the following occur:    Trouble breathing    Hard to wake up or confusion    Fainting (loss of consciousness)    Fast heart rate  When to get medical advice  Call your healthcare provider right away if any of these occur:    Fever of 100.4 F (38.0 C) or higher, or as directed by your healthcare provider    Symptoms are not better after 3 days of treatment    Back or belly pain that gets worse    Repeated vomiting, or unable to keep medicine down    Weakness or dizziness    Vaginal discharge    Pain, redness, or swelling in the outer vaginal area (labia)  [employer] last reviewed this educational content on 11/1/2019 2000-2021 [employer]. All rights reserved. This information is not intended as a substitute for professional medical care. Always follow your healthcare professional's instructions.                   Beverley Jacinto PA-C        Subjective:    66-year-old female presents for evaluation of dysuria and hematuria today.  No flank pain.  Some bladder pressure.   No fever or chills.  No nausea or vomiting.  Does have history of some chronic kidney disease and COPD.    No Known Allergies    Past Medical History:   Diagnosis Date     Bipolar 2 disorder (H)      COPD (chronic obstructive pulmonary disease) (H)      Depression      Ectopic pregnancy      ETOH abuse 2005 and 1999    treatment for both ETOH and crack     History of tubal ligation      HTN      Menopause      Neuroendocrine tumor 2013    s/p gastrectomy     Neuropathy      Osteoarthritis of both knees      Pancreatitis, acute     recurrent 7/2008     PTSD (post-traumatic stress disorder)      Recurrent HSV (herpes simplex virus)     genital     Tobacco abuse      Unspecified drug dependence, unspecified      Unspecified viral hepatitis C without hepatic coma     treated wtih harvoni 2014     Vitamin B12 deficiency        albuterol (PROAIR HFA) 108 (90 BASE) MCG/ACT inhaler, Inhale 2 puffs into the lungs every 6 hours as needed for shortness of breath / dyspnea.  amLODIPine (NORVASC) 5 MG tablet, TAKE 1 TABLET BY MOUTH EVERY DAY  ARIPiprazole (ABILIFY) 2 MG tablet, Take 2 mg by mouth  atorvastatin (LIPITOR) 40 MG tablet, TAKE 1 TABLET BY MOUTH EVERY DAY  carvedilol (COREG) 25 MG tablet, Take 25 mg by mouth 2 times daily (with meals)  cyanocobalamin 1000 MCG/ML injection, Inject 1 mL into the muscle every 30 days  diltiazem (TIAZAC) 360 MG 24 hr SA capsule, Take 1 capsule by mouth daily.  DULoxetine (CYMBALTA) 30 MG EC capsule, Take 30 mg by mouth daily Patient is taking 90mg  ferrous sulfate 325 (65 FE) MG tablet, Take 325 mg by mouth daily (with breakfast)   GABAPENTIN PO, Take 600 mg by mouth 3 times daily as needed (chronic back pain)   HYDROcodone-acetaminophen (NORCO) 5-325 MG per tablet, Take 1 tablet by mouth every 6 hours as needed for pain  multivitamin, therapeutic with minerals (THERA-VIT-M) TABS tablet, Take 1 tablet by mouth daily  Pyridoxine HCl (VITAMIN B6 PO), Take 25 mg by mouth daily   topiramate  (TOPAMAX) 100 MG tablet, TAKE 1 TABLET (100 MG) BY MOUTH TWICE DAILY FOR 90 DAYS.  TRAZODONE HCL PO, Take 150 mg by mouth At Bedtime  VITAMIN D, CHOLECALCIFEROL, PO, Take 1,000 Units by mouth daily  CHLORTHALIDONE PO, Take 12.5 mg by mouth daily (Patient not taking: Reported on 8/6/2021)  divalproex (DEPAKOTE) 500 MG EC tablet, Take 1 tablet (500 mg) by mouth (Patient not taking: Reported on 8/6/2021)  hydrALAZINE (APRESOLINE) 25 MG tablet, Take 1-2 tablets (25-50 mg) by mouth 4 times daily as needed (sbp>160) (Patient not taking: Reported on 8/6/2021)  nicotine (NICODERM CQ) 21 MG/24HR 24 hr patch, Place 1 patch onto the skin daily (Patient not taking: Reported on 8/6/2021)  penicillin V potassium (VEETID) 500 MG tablet, Take 1 tablet (500 mg) by mouth 2 times daily (Patient not taking: Reported on 8/6/2021)  Potassium Chloride Ana ER (K-DUR PO), Take 20 mEq by mouth daily (Patient not taking: Reported on 8/6/2021)  tiotropium (SPIRIVA) 18 MCG capsule, Inhale 18 mcg into the lungs daily as needed (Patient not taking: Reported on 8/6/2021)    No current facility-administered medications on file prior to visit.      Social History     Tobacco Use     Smoking status: Current Every Day Smoker     Packs/day: 1.00     Types: Cigarettes     Smokeless tobacco: Never Used   Substance Use Topics     Alcohol use: No     Comment: quit x 2.5 months ago gone back to AA     Drug use: No       ROS:  General: negative for fever  ABD: Denies abd pain  : as above    OBJECTIVE:  /82   Pulse 64   Temp 98.3  F (36.8  C) (Oral)   Resp 16   SpO2 97%    General:   awake, alert, and cooperative.  NAD.   Head: Normocephalic, atraumatic.  Eyes: Conjunctiva clear, non icteric.   ABD: soft, mild suprapubic tenderness to palpation , no rigidity, guarding or rebound . No CVAT  Neuro: Alert and oriented - normal speech.

## 2021-08-08 LAB — BACTERIA UR CULT: ABNORMAL

## 2021-09-06 NOTE — PROGRESS NOTES
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 responsibility is with these children and what hopes the person has for his or her future with these children.) My kids all liver nearby.  They are very supportive.    7A. Who supports you in making changes in your alcohol or drug use? What are they willing to do to support you? Who is upset or angry about you making changes in your alcohol or drug use? How big a problem is this for you?      My family.    7B. This table is provided to record information about the person s relationships and available support It is not necessary to ask each item; only to get a comprehensive picture of their support system.  How often can you count on the following people when you need someone?   Partner / Spouse N/A   Parent(s)/Aunt(s)/Uncle(s)/Grandparents N/A   Sibling(s)/Cousin(s) N/A   Child(maximiliano) Always supportive   Other relative(s) N/A   Friend(s)/neighbor(s) N/A   Child(maximiliano) s father(s)/mother(s) N/A   Support group member(s) Always supportive   Community of hussein members Always supportive   /counselor/therapist/healer N/A   Other (specify) N/A     8A. What is your current living situation?     I live in a townhouse.    8B. What is your long term plan for where you will be living?     I plan to stay where I am.    8C. Tell me about  your living environment/neighborhood? Ask enough follow up questions to determine safety, criminal activity, availability of alcohol and drugs, supportive or antagonistic to the person making changes.      I live in a good neighborhood.  Safe environment.    9. Criminal justice history: Gather current/recent history and any significant history related to substance use--Arrests? Convictions? Circumstances? Alcohol or drug involvement? Sentences? Still on probation or parole? Expectations of the court? Current court order? Any sex offenses - lifetime? What level? (DSM)    None    10. What obstacles exist to participating in treatment? (Time off work, childcare, funding, transportation, pending FDC time, living situation)     None    Dimension VI Ratings   Recovery environment - The placing authority must use the criteria in Dimension VI to determine a client s recovery environment.   RISK DESCRIPTIONS - Severity rating: 3 Client is not engaged in structured, meaningful activity and the client s peers, family, significant other, and living environment are unsupportive, or there is significant criminal justice system involvement.    REASONS SEVERITY WAS ASSIGNED - (What support does the person have for making changes? What structure/stability does the person have in his or her daily life that will increase the likelihood that changes can be sustained? What problems exist in the person s environment that will jeopardize getting/staying clean and sober?)     Pt reports that she is currently living with a roommate. Pt reports that she lacks a daily structure and meaningful activities. Pt reports that she is currently unemployed and is not attending school at this time.  Pt lacks a sober support network.         Client Choice/Exceptions   Would you like services specific to language, age, gender, culture, Scientology preference, race, ethnicity, sexual orientation or disability?  No    What particular treatment choices and  options would you like to have? Inpatient treatment.    Do you have a preference for a particular treatment program? [facility] or [facility].    Criteria for Diagnosis     Criteria for Diagnosis  DSM-5 Criteria for Substance Use Disorder  Instructions: Determine whether the client currently meets the criteria for Substance Use Disorder using the diagnostic criteria in the DSM-V pp.481-581. Current means during the most recent 12 months outside a facility that controls access to substances    Category of Substance Severity (ICD-10 Code / DSM 5 Code)     Alcohol Use Disorder Severe  (10.20) (303.90)   Cannabis Use Disorder NA   Hallucinogen Use Disorder NA   Inhalant Use Disorder NA   Opioid Use Disorder NA   Sedative, Hypnotic, or Anxiolytic Use Disorder NA   Stimulant Related Disorder NA   Tobacco Use Disorder Severe   (F17.200) (305.1)    Other (or unknown) Substance Use Disorder NA       Collateral Contact Summary   Number of contacts made: 2    Contact with referring person:  No.    If court related records were reviewed, summarize here: NA    Information from collateral contacts supported/largely agreed with information from the client and associated risk ratings.      Rule 25 Assessment Summary and Plan   's Recommendation    Recommend inpatient treatment.      Collateral Contacts     Name:    [facility] records   Relationship:    provider   Phone Number:    [phone] Releases:    No     Recommend inpatient treatment      Collateral Contacts     Name:    Dr. Hope   Relationship:    provider   Phone Number:    [phone]   Releases:    No     Recommend inpatient treatment    ollateral Contacts      A problematic pattern of alcohol/drug use leading to clinically significant impairment or distress, as manifested by at least two of the following, occurring within a 12-month period:    Alcohol/drug is often taken in larger amounts or over a longer period than was intended.  There is a persistent desire or  unsuccessful efforts to cut down or control alcohol/drug use  A great deal of time is spent in activities necessary to obtain alcohol, use alcohol, or recover from its effects.  Craving, or a strong desire or urge to use alcohol/drug  Recurrent alcohol/drug use resulting in a failure to fulfill major role obligations at work, school or home.  Continued alcohol use despite having persistent or recurrent social or interpersonal problems caused or exacerbated by the effects of alcohol/drug.  Important social, occupational, or recreational activities are given up or reduced because of alcohol/drug use.  Recurrent alcohol/drug use in situations in which it is physically hazardous.  Alcohol/drug use is continued despite knowledge of having a persistent or recurrent physical or psychological problem that is likely to have been caused or exacerbated by alcohol.  Tolerance, as defined by either of the following: A need for markedly increased amounts of alcohol/drug to achieve intoxication or desired effect. and A markedly diminished effect with continued use of the same amount of alcohol/drug.  Withdrawal, as manifested by either of the following: The characteristic withdrawal syndrome for alcohol/drug (refer to Criteria A and B of the criteria set for alcohol/drug withdrawal). and Alcohol/drug (or a closely related substance, such as a benzodiazepine) is taken to relieve or avoid withdrawal symptoms.      Specify if: In early remission:  After full criteria for alcohol/drug use disorder were previously met, none of the criteria for alcohol/drug use disorder have been met for at least 3 months but for less than 12 months (with the exception that Criterion A4,  Craving or a strong desire or urge to use alcohol/drug  may be met).     In sustained remission:   After full criteria for alcohol use disorder were previously met, non of the criteria for alcohol/drug use disorder have been met at any time during a period of 12 months  "or longer (with the exception that Criterion A4,  Craving or strong desire or urge to use alcohol/drug  may be met).   Specify if:   This additional specifier is used if the individual is in an environment where access to alcohol is restricted.    Mild: Presence of 2-3 symptoms    Moderate: Presence of 4-5 symptoms    Severe: Presence of 6 or more symptoms      H& P    DATE OF SERVICE:  12/26/2017       Ms. Taty Kearney, date of birth 1955, is a 62-year-old woman admitted to the [facility] unit on 12/26/2017.  She was admitted to the hospital to treat depression with plans to overdose on trazodone in the context of alcohol dependency.  She has had increased depression for the past 1-2 weeks, correlating with the holiday season.  She notes her mood has been \"low, low, low.\"       She has been taking Paxil for over 10 years, along with trazodone with limited results.       Psychiatric history is noted in addition to the depression for alcohol use.  She estimates she has had 4-5 previous chemical dependency treatments, and she has currently been drinking for about 4 months, having done treatment in the spring followed by chemical dependency and mental Health Dual Diagnosis outpatient treatment at [facility].  She is currently drinking a 6-pack plus \"2 shooters of yamile per day.\"  Alcohol level was 0.110.  She has taken Campral and naltrexone, one of these affected her kidneys and the other was simply not effective.       She would like to go to treatment again.       Depression onset was a few years ago.  She has been on Paxil for many years, over 10, and again notes it has not been terribly helpful.  She does wonder if she has bipolar illness because of mood swings and mood lability.  She also has spells where people tell she talks too much or talks too fast.       MEDICAL HISTORY:  Noted for stomach cancer.  She also suffers from hypertension.       MENTAL " STATUS EXAMINATION:  Shows an alert woman lying in bed.  Speech production is decreased.  Affect is sad.  Mood is moderately depressed.  There are no signs of psychosis.  She believes she will be safe with the support of the hospital.  She did receive Valium today for alcohol withdrawal.       Associations cognitions are intact and speech production is decreased.  There is a history of opiates and noting she has been off of these for 1 year.       DIAGNOSTIC IMPRESSION:   Axis I:  Bipolar illness, type 2, depressed.   II:  Posttraumatic stress disorder.   III:  Alcohol dependence and withdrawal (alcohol use disorders, severe with withdrawal).       PLAN:  Plan at this time is to stop the trazodone and start Depakote.  Paxil will switch to Cymbalta.  Estimated length of stay is 10 days.  Discharge criteria is manageability and safety for treatment.  Minipress will be started for posttraumatic stress disorder as she has nightmares of seeing dead people that are disturbing for her.           LORI JENKINS MD                D: 2017 08:53   T: 2017 09:12   MT:        Name:     ABILIO ENNIS   MRN:      [id number]        Account:      [id number]   :      1955           Admitted:     [id number]        PAST MEDICAL HISTORY:  No pertinent past medical history

## 2022-06-09 ENCOUNTER — TRANSCRIBE ORDERS (OUTPATIENT)
Dept: OTHER | Age: 67
End: 2022-06-09
Payer: COMMERCIAL

## 2022-06-09 DIAGNOSIS — R29.898 WEAKNESS OF BOTH LEGS: ICD-10-CM

## 2022-06-09 DIAGNOSIS — G57.00 PIRIFORMIS SYNDROME: Primary | ICD-10-CM

## 2022-06-27 ENCOUNTER — THERAPY VISIT (OUTPATIENT)
Dept: PHYSICAL THERAPY | Facility: CLINIC | Age: 67
End: 2022-06-27
Attending: PSYCHIATRY & NEUROLOGY
Payer: COMMERCIAL

## 2022-06-27 DIAGNOSIS — M54.41 CHRONIC MIDLINE LOW BACK PAIN WITH BILATERAL SCIATICA: ICD-10-CM

## 2022-06-27 DIAGNOSIS — M54.42 CHRONIC MIDLINE LOW BACK PAIN WITH BILATERAL SCIATICA: ICD-10-CM

## 2022-06-27 DIAGNOSIS — M79.18 BILATERAL BUTTOCK PAIN: ICD-10-CM

## 2022-06-27 DIAGNOSIS — M79.604 PAIN IN BOTH LOWER EXTREMITIES: ICD-10-CM

## 2022-06-27 DIAGNOSIS — M79.605 PAIN IN BOTH LOWER EXTREMITIES: ICD-10-CM

## 2022-06-27 DIAGNOSIS — G89.29 CHRONIC MIDLINE LOW BACK PAIN WITH BILATERAL SCIATICA: ICD-10-CM

## 2022-06-27 PROCEDURE — 97161 PT EVAL LOW COMPLEX 20 MIN: CPT | Mod: GP | Performed by: PHYSICAL THERAPIST

## 2022-06-27 PROCEDURE — 97110 THERAPEUTIC EXERCISES: CPT | Mod: GP | Performed by: PHYSICAL THERAPIST

## 2022-06-27 NOTE — PROGRESS NOTES
Physical Therapy Initial Evaluation  Subjective:  The history is provided by the patient. No  was used.   Patient Health History  Taty Kearney being seen for leg pain.     Problem began: 6/9/2022 (MD referral).   Problem occurred: unknown   Pain is reported as 7/10 on pain scale.  General health as reported by patient is fair.  Pertinent medical history includes: cancer, depression, hepatitis, high blood pressure, numbness/tingling, overweight and smoking.   Red flags:  Pain at rest/night.  Medical allergies: none.   Surgeries include:  Cancer surgery, orthopedic surgery and other. Other surgery history details: stomach; Ketan TKA (2013/2014), 3 c-sections.        Current occupation is Retired.                     Therapist Generated HPI Evaluation  Problem details: Patient presents to PT today with c/o ongoing Ketan buttock/leg pain; stated the pain started >1 year ago for no reason  .         Type of problem:  Lumbar.    This is a chronic condition.  Condition occurred with:  Insidious onset.  Where condition occurred: for unknown reasons.  Patient reports pain:  Lumbar spine right, lumbar spine left and central lumbar spine.  Pain is described as aching and sharp and is constant.  Pain radiates to:  Gluteals left, gluteals right, thigh left and thigh right (L> R side). Pain is the same all the time.  Since onset symptoms are unchanged.  Associated symptoms:  Loss of motion/stiffness, loss of strength and fatigue. Symptoms are exacerbated by standing, walking, certain positions, lying down and other (transfers)  and relieved by activity/movement and rest.  Special tests included:  X-ray (done at [facility]).  Previous treatment includes chiropractic and physical therapy (within the last year). There was none (bad response to both)) improvement following previous treatment.  Barriers include:  None as reported by patient.      Oswestry Score: 60 %                 Objective:  Standing Alignment:     Cervical/Thoracic:  Forward head and thoracic kyphosis decreased  Shoulder/UE:  Rounded shoulders  Lumbar:  Lordosis decr and anterior pelvic tilt                           Lumbar/SI Evaluation  ROM:    AROM Lumbar:   Flexion:          Touches toes; pulling back of legs  Ext:                    Min loss   Side Bend:        Left:  Mid thigh    Right:  Mid thigh  Rotation:           Left:     Right:   Side Glide:        Left:     Right:           Lumbar Myotomes:  normal            Lumbar DTR's:  not assessed            Lumbar Palpation:    Tenderness present at Left:    Quadratus Lumborum; Erector Spinae and Piriformis  Tenderness present at Right: Quadratus Lumborum; Erector Spinae and Piriformis      Spinal Segmental Conclusions:     Level: Hypo noted at L3, L4, L5 and S1                                                   General     ROS    Assessment/Plan:    Patient is a 67 year old female with lumbar complaints.    Patient has the following significant findings with corresponding treatment plan.                Diagnosis 1:  Ketan Buttock/leg pain/ Piriformis syndrome  Pain -  hot/cold therapy and manual therapy  Decreased ROM/flexibility - manual therapy and therapeutic exercise  Decreased joint mobility - manual therapy and therapeutic exercise  Impaired muscle performance - neuro re-education  Decreased function - therapeutic activities  Impaired posture - neuro re-education    Therapy Evaluation Codes:   1) History comprised of:   Personal factors that impact the plan of care:      Past/current experiences.    Comorbidity factors that impact the plan of care are:      Cancer, Depression, High blood pressure, Numbness/tingling, Overweight, Smoking and Hepatitis.     Medications impacting care: High blood pressure, Pain and see list in epic.  2) Examination of Body Systems comprised of:   Body structures and functions that impact the plan of care:      Lumbar spine and buttock pain.   Activity limitations that  impact the plan of care are:      Bending, Lifting, Squatting/kneeling, Standing, Walking and transitions.  3) Clinical presentation characteristics are:   Stable/Uncomplicated.  4) Decision-Making    Low complexity using standardized patient assessment instrument and/or measureable assessment of functional outcome.  Cumulative Therapy Evaluation is: Low complexity.    Previous and current functional limitations:  (See Goal Flow Sheet for this information)    Short term and Long term goals: (See Goal Flow Sheet for this information)     Communication ability:  Patient appears to be able to clearly communicate and understand verbal and written communication and follow directions correctly.  Treatment Explanation - The following has been discussed with the patient:   RX ordered/plan of care  Anticipated outcomes  Possible risks and side effects  This patient would benefit from PT intervention to resume normal activities.   Rehab potential is good.    Frequency:  1 X week, once daily  Duration:  for 8 weeks  Discharge Plan:  Achieve all LTG.  Independent in home treatment program.  Reach maximal therapeutic benefit.    Please refer to the daily flowsheet for treatment today, total treatment time and time spent performing 1:1 timed codes.

## 2022-06-27 NOTE — PROGRESS NOTES
[facility]    OUTPATIENT Physical Therapy ORTHOPEDIC EVALUATION  PLAN OF TREATMENT FOR OUTPATIENT REHABILITATION  (COMPLETE FOR INITIAL CLAIMS ONLY)  Patient's Last Name, First Name, M.I.  YOB: 1955  Taty Kearney    Provider s Name:  [facility]   Medical Record No.  [id number]   Start of Care Date:  06/27/22   Onset Date:   06/09/22 (MD referral)   Type:     _X__PT   ___OT Medical Diagnosis:    Encounter Diagnoses   Name Primary?    Bilateral buttock pain     Pain in both lower extremities     Chronic midline low back pain with bilateral sciatica         Treatment Diagnosis:  Buttock/Leg pain (piriformis syndrome per MD)        Goals:     06/27/22 0500   Body Part   Goals listed below are for piriformis syndrome   Goal #1   Goal #1 self cares/transfers/bed mobility   Previous Functional Level No restrictions   Current Functional Level Able    Performance Level transition sit to stand; 8/10PL   STG Target Performance Be able to ; transfer in and out of a chair   Performance Level 4/10PL   Rationale for independent self care such as dressing, personal hygiene, bathing;for independent community transportation;for independent living   Due Date 07/25/22   LTG Target Performance Be able to ; transfer in and out of a chair   Performance level 0-2/10PL   Rationale independence in self cares;for independent community transportation;for independent living   Due Date 08/22/22       Therapy Frequency:  1x/week  Predicted Duration of Therapy Intervention:  x8 weeks    Jo Leon, PT                 I CERTIFY THE NEED FOR THESE SERVICES FURNISHED UNDER        THIS PLAN OF TREATMENT AND WHILE UNDER MY CARE .             Physician Signature               Date    X_____________________________________________________                         Certification Date From:   06/27/22   Certification Date To:  08/22/22    Referring Provider:  Senthil Velázquez MD    Initial Assessment        See Epic Evaluation SOC Date: 06/27/22

## 2022-07-08 ENCOUNTER — THERAPY VISIT (OUTPATIENT)
Dept: PHYSICAL THERAPY | Facility: CLINIC | Age: 67
End: 2022-07-08
Payer: COMMERCIAL

## 2022-07-08 DIAGNOSIS — M79.18 BILATERAL BUTTOCK PAIN: Primary | ICD-10-CM

## 2022-07-08 DIAGNOSIS — M79.605 PAIN IN BOTH LOWER EXTREMITIES: ICD-10-CM

## 2022-07-08 DIAGNOSIS — M54.41 CHRONIC MIDLINE LOW BACK PAIN WITH BILATERAL SCIATICA: ICD-10-CM

## 2022-07-08 DIAGNOSIS — M79.604 PAIN IN BOTH LOWER EXTREMITIES: ICD-10-CM

## 2022-07-08 DIAGNOSIS — G89.29 CHRONIC MIDLINE LOW BACK PAIN WITH BILATERAL SCIATICA: ICD-10-CM

## 2022-07-08 DIAGNOSIS — M54.42 CHRONIC MIDLINE LOW BACK PAIN WITH BILATERAL SCIATICA: ICD-10-CM

## 2022-07-08 PROCEDURE — 97140 MANUAL THERAPY 1/> REGIONS: CPT | Mod: GP

## 2022-07-08 PROCEDURE — 97110 THERAPEUTIC EXERCISES: CPT | Mod: GP

## 2022-07-21 ENCOUNTER — THERAPY VISIT (OUTPATIENT)
Dept: PHYSICAL THERAPY | Facility: CLINIC | Age: 67
End: 2022-07-21
Payer: COMMERCIAL

## 2022-07-21 DIAGNOSIS — M79.605 PAIN IN BOTH LOWER EXTREMITIES: ICD-10-CM

## 2022-07-21 DIAGNOSIS — M79.18 BILATERAL BUTTOCK PAIN: Primary | ICD-10-CM

## 2022-07-21 DIAGNOSIS — M54.42 CHRONIC MIDLINE LOW BACK PAIN WITH BILATERAL SCIATICA: ICD-10-CM

## 2022-07-21 DIAGNOSIS — M79.604 PAIN IN BOTH LOWER EXTREMITIES: ICD-10-CM

## 2022-07-21 DIAGNOSIS — M54.41 CHRONIC MIDLINE LOW BACK PAIN WITH BILATERAL SCIATICA: ICD-10-CM

## 2022-07-21 DIAGNOSIS — G89.29 CHRONIC MIDLINE LOW BACK PAIN WITH BILATERAL SCIATICA: ICD-10-CM

## 2022-07-21 PROCEDURE — 97110 THERAPEUTIC EXERCISES: CPT | Mod: GP | Performed by: PHYSICAL THERAPIST

## 2022-07-21 PROCEDURE — 97140 MANUAL THERAPY 1/> REGIONS: CPT | Mod: GP | Performed by: PHYSICAL THERAPIST

## 2022-07-21 PROCEDURE — 97035 APP MDLTY 1+ULTRASOUND EA 15: CPT | Mod: GP | Performed by: PHYSICAL THERAPIST

## 2022-07-29 ENCOUNTER — THERAPY VISIT (OUTPATIENT)
Dept: PHYSICAL THERAPY | Facility: CLINIC | Age: 67
End: 2022-07-29
Payer: COMMERCIAL

## 2022-07-29 DIAGNOSIS — M79.605 PAIN IN BOTH LOWER EXTREMITIES: ICD-10-CM

## 2022-07-29 DIAGNOSIS — G89.29 CHRONIC MIDLINE LOW BACK PAIN WITH BILATERAL SCIATICA: ICD-10-CM

## 2022-07-29 DIAGNOSIS — M79.18 BILATERAL BUTTOCK PAIN: Primary | ICD-10-CM

## 2022-07-29 DIAGNOSIS — M54.41 CHRONIC MIDLINE LOW BACK PAIN WITH BILATERAL SCIATICA: ICD-10-CM

## 2022-07-29 DIAGNOSIS — M79.604 PAIN IN BOTH LOWER EXTREMITIES: ICD-10-CM

## 2022-07-29 DIAGNOSIS — M54.42 CHRONIC MIDLINE LOW BACK PAIN WITH BILATERAL SCIATICA: ICD-10-CM

## 2022-07-29 PROCEDURE — 97140 MANUAL THERAPY 1/> REGIONS: CPT | Mod: GP

## 2022-07-29 PROCEDURE — 97035 APP MDLTY 1+ULTRASOUND EA 15: CPT | Mod: GP

## 2022-07-29 PROCEDURE — 97110 THERAPEUTIC EXERCISES: CPT | Mod: GP

## 2022-08-05 ENCOUNTER — THERAPY VISIT (OUTPATIENT)
Dept: PHYSICAL THERAPY | Facility: CLINIC | Age: 67
End: 2022-08-05
Payer: COMMERCIAL

## 2022-08-05 DIAGNOSIS — M54.41 CHRONIC MIDLINE LOW BACK PAIN WITH BILATERAL SCIATICA: ICD-10-CM

## 2022-08-05 DIAGNOSIS — M79.18 BILATERAL BUTTOCK PAIN: Primary | ICD-10-CM

## 2022-08-05 DIAGNOSIS — M54.42 CHRONIC MIDLINE LOW BACK PAIN WITH BILATERAL SCIATICA: ICD-10-CM

## 2022-08-05 DIAGNOSIS — M79.605 PAIN IN BOTH LOWER EXTREMITIES: ICD-10-CM

## 2022-08-05 DIAGNOSIS — G89.29 CHRONIC MIDLINE LOW BACK PAIN WITH BILATERAL SCIATICA: ICD-10-CM

## 2022-08-05 DIAGNOSIS — M79.604 PAIN IN BOTH LOWER EXTREMITIES: ICD-10-CM

## 2022-08-05 PROCEDURE — 97110 THERAPEUTIC EXERCISES: CPT | Mod: GP

## 2022-08-05 PROCEDURE — 97035 APP MDLTY 1+ULTRASOUND EA 15: CPT | Mod: GP

## 2022-08-19 ENCOUNTER — THERAPY VISIT (OUTPATIENT)
Dept: PHYSICAL THERAPY | Facility: CLINIC | Age: 67
End: 2022-08-19
Payer: COMMERCIAL

## 2022-08-19 DIAGNOSIS — M79.605 PAIN IN BOTH LOWER EXTREMITIES: ICD-10-CM

## 2022-08-19 DIAGNOSIS — M54.42 CHRONIC MIDLINE LOW BACK PAIN WITH BILATERAL SCIATICA: ICD-10-CM

## 2022-08-19 DIAGNOSIS — M54.41 CHRONIC MIDLINE LOW BACK PAIN WITH BILATERAL SCIATICA: ICD-10-CM

## 2022-08-19 DIAGNOSIS — M79.18 BILATERAL BUTTOCK PAIN: Primary | ICD-10-CM

## 2022-08-19 DIAGNOSIS — M79.604 PAIN IN BOTH LOWER EXTREMITIES: ICD-10-CM

## 2022-08-19 DIAGNOSIS — G89.29 CHRONIC MIDLINE LOW BACK PAIN WITH BILATERAL SCIATICA: ICD-10-CM

## 2022-08-19 PROCEDURE — 97110 THERAPEUTIC EXERCISES: CPT | Mod: GP | Performed by: PHYSICAL THERAPIST

## 2022-08-19 PROCEDURE — 97140 MANUAL THERAPY 1/> REGIONS: CPT | Mod: GP | Performed by: PHYSICAL THERAPIST

## 2022-08-19 NOTE — PROGRESS NOTES
Subjective:  HPI  Physical Exam  Oswestry Score: 57.78 %                 Objective:  System    Physical Exam    General     ROS    Assessment/Plan:    PROGRESS  REPORT    Progress reporting period is from 6/27/2022 to 8/19/2022.       SUBJECTIVE  Subjective changes noted by patient:  Patient stated PT as helped slightly; there were times she went home and had pain for a few days and other times she went home feeling good for a few days;  No long term affects.  Patient is still having difficulty with standing or walking for prolong periods of time; pain limits her ability to partake in activities with family and friends.  Pateint stated exercises do help but    Current pain level is  5/10 (with medication).     Previous pain level was  : 7/10.   Changes in function:  Yes (See Goal flowsheet attached for changes in current functional level)  Adverse reaction to treatment or activity: activity - increase pain    OBJECTIVE  Changes noted in objective findings:    TROM: flex= ankle level, Ext= mod loss, SB WNL.    LE strength is good.    Reviewed HEP and tolerated well.     ASSESSMENT/PLAN  Updated problem list and treatment plan: Diagnosis 1:  Ketan Buttock/leg pain/piriformis syndrome  Pain -  hot/cold therapy and home program  Impaired muscle performance - neuro re-education and home program  Decreased function - therapeutic activities and home program  STG/LTGs have been met or progress has been made towards goals:  Yes (See Goal flow sheet completed today.)  Assessment of Progress: The patient's progress has slowed.  Self Management Plans:  Patient has been instructed in a home treatment program.  Patient is independent in a home treatment program.    Recommendations:  Patient to trial HEP; will meet with MD and discuss possible injection      Please refer to the daily flowsheet for treatment today, total treatment time and time spent performing 1:1 timed codes.

## 2022-08-19 NOTE — LETTER
Psychiatric hospital  [address] [facility] [address]  [phone]    2022    Re: Taty Kearney   :   1955  MRN:  [id number]   REFERRING PHYSICIAN:   Senthil Velázquez MD    Psychiatric hospital  Date of Initial Evaluation:  2022  Visits:  Rxs Used: 6  Reason for Referral:     Bilateral buttock pain  Pain in both lower extremities  Chronic midline low back pain with bilateral sciatica    PROGRESS  REPORT  Progress reporting period is from 2022 to 2022.       SUBJECTIVE  Subjective changes noted by patient:  Patient stated PT as helped slightly; there were times she went home and had pain for a few days and other times she went home feeling good for a few days;  No long term affects.  Patient is still having difficulty with standing or walking for prolong periods of time; pain limits her ability to partake in activities with family and friends.  Pateint stated exercises do help but    Current pain level is  5/10 (with medication).     Previous pain level was  : 7/10.   Changes in function:  Yes (See Goal flowsheet attached for changes in current functional level)  Adverse reaction to treatment or activity: activity - increase pain    OBJECTIVE  Changes noted in objective findings:    TROM: flex= ankle level, Ext= mod loss, SB WNL.    LE strength is good.    Reviewed HEP and tolerated well.     ASSESSMENT/PLAN  Updated problem list and treatment plan: Diagnosis 1:  Ketan Buttock/leg pain/piriformis syndrome  Pain -  hot/cold therapy and home program  Impaired muscle performance - neuro re-education and home program  Decreased function - therapeutic activities and home program  STG/LTGs have been met or progress has been made towards goals:  Yes (See Goal flow sheet completed today.)  Assessment of Progress: The patient's progress has slowed.  Self Management Plans:  Patient has been instructed  in a home treatment program.  Patient is independent in a home treatment program.        Re: Taty Westoney   :   1955    Recommendations:  Patient to trial HEP; will meet with MD and discuss possible injection      Please refer to the daily flowsheet for treatment today, total treatment time and time spent performing 1:1 timed codes.          Thank you for your referral.    INQUIRIES  Therapist: Jo Leon [address] [address]  Phone: [phone]  Fax: [phone]

## 2022-11-29 PROBLEM — M54.41 CHRONIC MIDLINE LOW BACK PAIN WITH BILATERAL SCIATICA: Status: RESOLVED | Noted: 2022-06-27 | Resolved: 2022-11-29

## 2022-11-29 PROBLEM — M79.605 PAIN IN BOTH LOWER EXTREMITIES: Status: RESOLVED | Noted: 2022-06-27 | Resolved: 2022-11-29

## 2022-11-29 PROBLEM — M79.18 BILATERAL BUTTOCK PAIN: Status: RESOLVED | Noted: 2022-06-27 | Resolved: 2022-11-29

## 2022-11-29 PROBLEM — M79.604 PAIN IN BOTH LOWER EXTREMITIES: Status: RESOLVED | Noted: 2022-06-27 | Resolved: 2022-11-29

## 2022-11-29 PROBLEM — M54.42 CHRONIC MIDLINE LOW BACK PAIN WITH BILATERAL SCIATICA: Status: RESOLVED | Noted: 2022-06-27 | Resolved: 2022-11-29

## 2022-11-29 PROBLEM — G89.29 CHRONIC MIDLINE LOW BACK PAIN WITH BILATERAL SCIATICA: Status: RESOLVED | Noted: 2022-06-27 | Resolved: 2022-11-29

## 2023-09-14 ENCOUNTER — OFFICE VISIT (OUTPATIENT)
Dept: URGENT CARE | Facility: URGENT CARE | Age: 68
End: 2023-09-14
Payer: COMMERCIAL

## 2023-09-14 VITALS
TEMPERATURE: 96.7 F | SYSTOLIC BLOOD PRESSURE: 150 MMHG | DIASTOLIC BLOOD PRESSURE: 97 MMHG | HEART RATE: 73 BPM | WEIGHT: 172.9 LBS | BODY MASS INDEX: 28.77 KG/M2 | RESPIRATION RATE: 14 BRPM | OXYGEN SATURATION: 98 %

## 2023-09-14 DIAGNOSIS — F17.200 TOBACCO USE DISORDER: ICD-10-CM

## 2023-09-14 DIAGNOSIS — J42 CHRONIC BRONCHITIS, UNSPECIFIED CHRONIC BRONCHITIS TYPE (H): Primary | ICD-10-CM

## 2023-09-14 PROCEDURE — 99214 OFFICE O/P EST MOD 30 MIN: CPT | Performed by: FAMILY MEDICINE

## 2023-09-14 RX ORDER — PREDNISONE 20 MG/1
TABLET ORAL
Qty: 12 TABLET | Refills: 0 | Status: SHIPPED | OUTPATIENT
Start: 2023-09-14

## 2023-09-14 NOTE — PATIENT INSTRUCTIONS
Take prescribed medication as directed.    Use inhaler 4 times daily.    Return for re check if not improving.

## 2023-09-14 NOTE — PROGRESS NOTES
(J42) Chronic bronchitis, unspecified chronic bronchitis type (H)  (primary encounter diagnosis)  Comment:   Likely exacerbation.  No fever.  Plan: amoxicillin-clavulanate (AUGMENTIN) 875-125 MG         tablet, predniSONE (DELTASONE) 20 MG tablet            (F17.200) Tobacco use disorder  Comment:   An additional factor, 1 pack/day smoking.  Plan: Advised.      CHIEF COMPLAINT    Persistent cough for 2 weeks.      HISTORY    This is a 68-year-old woman complains of cough for about 2 weeks.  She brings up some sputum.  She feels some sweats.  Has not noticed fever.  She does not feel especially short of breath.    She does have 1 pack/day smoking habit.    She has an albuterol inhaler but has not used it recently.    Meds were reviewed.      REVIEW OF SYSTEMS    No sore throat or ear pain.  No chest pain or palpitations.  No edema.  No nausea or abdominal pain.  No weakness.      EXAM  BP (!) 150/97 (BP Location: Left arm, Patient Position: Sitting, Cuff Size: Adult Regular)   Pulse 73   Temp (!) 96.7  F (35.9  C) (Tympanic)   Resp 14   Wt 78.4 kg (172 lb 14.4 oz)   SpO2 98%   BMI 28.77 kg/m      Alert, afebrile.  Pharynx unremarkable.  No cervical adenopathy or thyromegaly.  Lungs bilateral mild expiratory wheezes, few basilar rales, no consolidation or labored breathing.  Cardiac RSR.  Extremities no edema.